# Patient Record
Sex: MALE | Race: WHITE | Employment: OTHER | URBAN - METROPOLITAN AREA
[De-identification: names, ages, dates, MRNs, and addresses within clinical notes are randomized per-mention and may not be internally consistent; named-entity substitution may affect disease eponyms.]

---

## 2017-04-03 ENCOUNTER — OFFICE VISIT (OUTPATIENT)
Dept: CARDIOLOGY CLINIC | Age: 72
End: 2017-04-03

## 2017-04-03 ENCOUNTER — NURSE ONLY (OUTPATIENT)
Dept: CARDIOLOGY CLINIC | Age: 72
End: 2017-04-03

## 2017-04-03 VITALS
SYSTOLIC BLOOD PRESSURE: 150 MMHG | DIASTOLIC BLOOD PRESSURE: 90 MMHG | HEART RATE: 66 BPM | WEIGHT: 219 LBS | HEIGHT: 73 IN | BODY MASS INDEX: 29.03 KG/M2

## 2017-04-03 DIAGNOSIS — R00.2 PALPITATIONS: Primary | ICD-10-CM

## 2017-04-03 DIAGNOSIS — E78.5 HYPERLIPIDEMIA, UNSPECIFIED HYPERLIPIDEMIA TYPE: ICD-10-CM

## 2017-04-03 PROCEDURE — 99243 OFF/OP CNSLTJ NEW/EST LOW 30: CPT | Performed by: INTERNAL MEDICINE

## 2017-04-03 PROCEDURE — 93000 ELECTROCARDIOGRAM COMPLETE: CPT | Performed by: INTERNAL MEDICINE

## 2017-04-03 RX ORDER — SIMVASTATIN 10 MG
10 TABLET ORAL NIGHTLY
COMMUNITY

## 2017-05-04 ENCOUNTER — TELEPHONE (OUTPATIENT)
Dept: CARDIOLOGY CLINIC | Age: 72
End: 2017-05-04

## 2017-05-05 PROCEDURE — 93228 REMOTE 30 DAY ECG REV/REPORT: CPT | Performed by: INTERNAL MEDICINE

## 2017-05-15 ENCOUNTER — OFFICE VISIT (OUTPATIENT)
Dept: CARDIOLOGY CLINIC | Age: 72
End: 2017-05-15

## 2017-05-15 VITALS
WEIGHT: 214 LBS | HEIGHT: 73 IN | SYSTOLIC BLOOD PRESSURE: 120 MMHG | BODY MASS INDEX: 28.36 KG/M2 | HEART RATE: 62 BPM | DIASTOLIC BLOOD PRESSURE: 70 MMHG

## 2017-05-15 DIAGNOSIS — R00.2 PALPITATION: Primary | ICD-10-CM

## 2017-05-15 DIAGNOSIS — E78.5 HYPERLIPIDEMIA, UNSPECIFIED HYPERLIPIDEMIA TYPE: ICD-10-CM

## 2017-05-15 PROCEDURE — 99214 OFFICE O/P EST MOD 30 MIN: CPT | Performed by: INTERNAL MEDICINE

## 2017-05-15 PROCEDURE — 93000 ELECTROCARDIOGRAM COMPLETE: CPT | Performed by: INTERNAL MEDICINE

## 2017-05-15 RX ORDER — AZITHROMYCIN 250 MG/1
250 TABLET, FILM COATED ORAL DAILY
COMMUNITY
End: 2017-07-13

## 2017-07-13 ENCOUNTER — TELEPHONE (OUTPATIENT)
Dept: CARDIOLOGY CLINIC | Age: 72
End: 2017-07-13

## 2017-07-13 DIAGNOSIS — R00.2 PALPITATIONS: Primary | ICD-10-CM

## 2017-07-17 ENCOUNTER — NURSE ONLY (OUTPATIENT)
Dept: CARDIOLOGY CLINIC | Age: 72
End: 2017-07-17

## 2017-07-17 DIAGNOSIS — R00.2 PALPITATIONS: ICD-10-CM

## 2017-07-24 ENCOUNTER — TELEPHONE (OUTPATIENT)
Dept: CARDIOLOGY CLINIC | Age: 72
End: 2017-07-24

## 2017-08-30 PROCEDURE — 93228 REMOTE 30 DAY ECG REV/REPORT: CPT | Performed by: INTERNAL MEDICINE

## 2017-09-05 ENCOUNTER — TELEPHONE (OUTPATIENT)
Dept: CARDIOLOGY CLINIC | Age: 72
End: 2017-09-05

## 2017-09-07 ENCOUNTER — TELEPHONE (OUTPATIENT)
Dept: CARDIOLOGY CLINIC | Age: 72
End: 2017-09-07

## 2017-10-06 ENCOUNTER — OFFICE VISIT (OUTPATIENT)
Dept: CARDIOLOGY CLINIC | Age: 72
End: 2017-10-06

## 2017-10-06 VITALS
BODY MASS INDEX: 28.23 KG/M2 | WEIGHT: 213 LBS | SYSTOLIC BLOOD PRESSURE: 130 MMHG | OXYGEN SATURATION: 98 % | DIASTOLIC BLOOD PRESSURE: 80 MMHG | HEART RATE: 72 BPM | HEIGHT: 73 IN

## 2017-10-06 DIAGNOSIS — R00.2 PALPITATIONS: Primary | ICD-10-CM

## 2017-10-06 DIAGNOSIS — E78.5 HYPERLIPIDEMIA, UNSPECIFIED HYPERLIPIDEMIA TYPE: ICD-10-CM

## 2017-10-06 PROCEDURE — 99214 OFFICE O/P EST MOD 30 MIN: CPT | Performed by: INTERNAL MEDICINE

## 2017-10-06 PROCEDURE — 93000 ELECTROCARDIOGRAM COMPLETE: CPT | Performed by: INTERNAL MEDICINE

## 2017-10-06 NOTE — LETTER
[address] [address] [address] [address]  Phone: [phone]  Fax: [phone]    Abran Swanson MD        October 9, 2017     Kareem Mendoza MD  [facility] [id number]    Patient: Mark Tate  MR Number: [id number]  YOB: 1945  Date of Visit: 10/6/2017    Dear Dr. Kareem Mendoza:    Below are the relevant portions of my assessment and plan of care. [address]   Cardiac Follow up    Referring Provider:  Kareem Mendoza MD     Chief Complaint   Patient presents with    Palpitations     review mcot       HPI:  Mark Tate is a 71 y.o. male who is here for follow up on his history of palpitations. Most of his palpitations tend to occur at night and wake him up from his sleep,usually between 1-3 am, described as heart racing with regular rhythm. He did have one episode that occurred during the day. Episode lasted up to 30 minutes although majority of episodes lasted a few minutes. He has had 10-12 episodes since onset of symptoms. He has not identified triggers for palpitations. He denies associated symptoms of shortness of breath, chest discomfort, dizziness. He drinks coffee in the am but he doesn't consume caffeinated products later in the day. He underwent Nuclear stress test in 2014 due to complaints in palpitations which showed normal perfusion and EF 63%. He wore a holter monitor in 5/2016 which showed SR with avg HR 62 (), 37 PACs, 3543 PVCs. MCOT monitor worn 4/4/17- 5/3/17> showed SR with PVCs, all of which were automatic triggers. He did not have his concerning sx. MCOT monitor worn 7/31/- 8/2/17 - PSVT (? AVNRT ?), NSVT    Today he states he he pushed the monitor once when having symptoms, but he did not think the monitor transmitted a rhythm strip. He also had several other brief palpitations.   The pattern of his palpitations have not

## 2017-10-06 NOTE — PATIENT INSTRUCTIONS
groin, arm, or neck. Then your doctor feeds them into the heart. Wires in the catheters help the doctor find the problem areas. Then he or she uses the wires to send energy to destroy the tiny areas of heart tissue that are causing the problems. It may seem like a bad idea to destroy parts of your heart on purpose. But the areas that are destroyed are very tiny. They should not affect your heart's ability to do its job. You may be awake during the procedure. Or you may be asleep. The doctor will give you medicines to help you feel relaxed and to numb the areas where the catheters go in. You may feel a little uncomfortable, but you should not feel pain. This procedure usually takes 2 to 6 hours. In rare cases, it can take longer. You may stay overnight in the hospital. How long you stay in the hospital depends on the type of ablation you have. What can you expect after catheter ablation? Do not exercise hard or lift anything heavy for a week. You will probably be able to go back to work and to your normal routine in 1 or 2 days. You may have swelling, bruising, or a small lump around the site where the catheters went into your body. These should go away in 3 to 4 weeks. You may have to take some medicines for a while. Follow-up care is a key part of your treatment and safety. Be sure to make and go to all appointments, and call your doctor if you are having problems. It's also a good idea to know your test results and keep a list of the medicines you take. Where can you learn more? Go to https://Medikal.compeIndeed.[employer]. org and sign in to your Winkcam account. Enter A314 in the Billetto box to learn more about \"Learning About Catheter Ablation for Heart Rhythm Problems. \"     If you do not have an account, please click on the \"Sign Up Now\" link. Current as of: July 28, 2016  Content Version: 11.3  © 3681-8652 sciencebite, Incorporated.  Care instructions adapted under license by [facility]

## 2017-10-06 NOTE — MR AVS SNAPSHOT
After Visit Summary             Donney Aschoff   10/6/2017 1:15 PM   Office Visit    Description:  Male : 1945   Provider:  Lisette Diaz MD   Department:  [address] [facility] and Future Appointments         Below is a list of your follow-up and future appointments. This may not be a complete list as you may have made appointments directly with providers that we are not aware of or your providers may have made some for you. Please call your providers to confirm appointments. It is important to keep your appointments. Please bring your current insurance card, photo ID, co-pay, and all medication bottles to your appointment. If self-pay, payment is expected at the time of service. Your To-Do List     Follow-Up    Return in about 6 months (around 2018). Information from Your Visit        Department     Name Address Phone Fax    [address] [address],[address]  [facility] [address] [facility] [phone]      You Were Seen for:         Comments    Palpitations   [785. 1. ICD-9-CM]         Vital Signs     Blood Pressure Pulse Height Weight Oxygen Saturation Body Mass Index    130/80 72 6' 1\" (1.854 m) 213 lb (96.6 kg) 98% 28.1 kg/m2    Smoking Status                   Former Smoker           Additional Information about your Body Mass Index (BMI)           Your BMI as listed above is considered overweight (25.0-29.9). BMI is an estimate of body fat, calculated from your height and weight. The higher your BMI, the greater your risk of heart disease, high blood pressure, type 2 diabetes, stroke, gallstones, arthritis, sleep apnea, and certain cancers. BMI is not perfect. It may overestimate body fat in athletes and people who are more muscular. If your body fat is high you can improve your BMI by decreasing your calorie intake and becoming more physically active. Learn more at: ToyTalkco.uk          Instructions    Vagal maneuvers to stop heart racing/palpitations  1. Bear down like you are having a bowel movement  2. Lie down and breathe in and out slowly and deeply (deep cleansing breaths)  3. Take a cold shower, cold water to face. Consider ablation if episodes become more frequent and bothersome         Supraventricular Tachycardia: Care Instructions  Your Care Instructions    Having supraventricular tachycardia (SVT) means that from time to time your heart beats abnormally fast. This fast rhythm is caused by changes in the electrical system of your heart. You may feel a fluttering in your chest (palpitations) and have a fast pulse. When your heart is beating fast, you may feel anxious and lightheaded, be short of breath, and feel discomfort in the chest.  Your doctor may prescribe medicines to help slow down your heartbeat. Your doctor may also suggest you try vagal maneuvers when having an episode of SVT. These are things, like bearing down, that might help slow your heart rate. Bearing down means that you try to breathe out with your stomach muscles but you don't let air out of your nose or mouth. Your doctor can show you how to do vagal maneuvers. He or she may suggest you lie down on your back to do them. In some cases, either cardioversion treatment or a procedure called catheter ablation is done to correct SVT. Your doctor may ask you to wear a small electronic device for 1 or 2 days to monitor your heart. It is called a Holter monitor. Follow-up care is a key part of your treatment and safety. Be sure to make and go to all appointments, and call your doctor if you are having problems. It's also a good idea to know your test results and keep a list of the medicines you take. How can you care for yourself at home? · Take your medicines exactly as prescribed.  Call your doctor if you think you are having a problem with your medicine. You will get more details on the specific medicines your doctor prescribes. · If your doctor showed you how to do vagal maneuvers, try them when you have an episode. These maneuvers include bearing down or putting an ice-cold, wet towel on your face. · Monitor your condition by keeping a diary of your SVT episodes. Bring this to your doctor appointments. ¨ Write down how fast or slow your heart was beating. To count your heart rate:  1. Gently place 2 fingers of your hand on the inside of your other wrist, below your thumb. 2. Count the beats for 30 seconds. 3. Then, double the result to get the number of beats per minute. ¨ Write down if your heart rhythm was regular or irregular. ¨ Write down the symptoms you had. ¨ Write down the time of day your symptoms occurred. ¨ Write down how long your symptoms lasted. ¨ Write down what you were doing when your symptoms started. ¨ Write down what may have helped your symptoms go away. · If they trigger episodes, limit or avoid alcohol or drinks with caffeine. · Do not use over-the-counter decongestants, herbal remedies, diet pills, or \"pep\" pills, which often contain stimulants. · Do not use illegal drugs, such as cocaine, ecstasy, or methamphetamine, which can speed up your heart's rhythm. · Do not smoke. Smoking can make this condition worse. If you need help quitting, talk to your doctor about stop-smoking programs and medicines. These can increase your chances of quitting for good. · Be alert for new or worsening symptoms, such as shortness of breath, pounding of your heart, or unusual tiredness. If new symptoms develop or your symptoms become worse, call your doctor. When should you call for help? Call 911 anytime you think you may need emergency care. For example, call if:  · You passed out (lost consciousness). · You have symptoms of a heart attack.  These may include: Catheter ablation is a way to get into your heart and fix the problem. Ablation is not surgery. How is catheter ablation done? Your doctor inserts thin tubes called catheters into a blood vessel in your groin, arm, or neck. Then your doctor feeds them into the heart. Wires in the catheters help the doctor find the problem areas. Then he or she uses the wires to send energy to destroy the tiny areas of heart tissue that are causing the problems. It may seem like a bad idea to destroy parts of your heart on purpose. But the areas that are destroyed are very tiny. They should not affect your heart's ability to do its job. You may be awake during the procedure. Or you may be asleep. The doctor will give you medicines to help you feel relaxed and to numb the areas where the catheters go in. You may feel a little uncomfortable, but you should not feel pain. This procedure usually takes 2 to 6 hours. In rare cases, it can take longer. You may stay overnight in the hospital. How long you stay in the hospital depends on the type of ablation you have. What can you expect after catheter ablation? Do not exercise hard or lift anything heavy for a week. You will probably be able to go back to work and to your normal routine in 1 or 2 days. You may have swelling, bruising, or a small lump around the site where the catheters went into your body. These should go away in 3 to 4 weeks. You may have to take some medicines for a while. Follow-up care is a key part of your treatment and safety. Be sure to make and go to all appointments, and call your doctor if you are having problems. It's also a good idea to know your test results and keep a list of the medicines you take. Where can you learn more? Go to https://rickey.AnyWare Group. org and sign in to your Ngt4u.inc account. Enter I028 in the [facility] box to learn more about \"Learning About Catheter Ablation for Heart Rhythm Problems. \" If you do not have an account, please click on the \"Sign Up Now\" link. Current as of: July 28, 2016  Content Version: 11.3  © 4652-0166 HelpAround, Incorporated. Care instructions adapted under license by [facility] ([facility]). If you have questions about a medical condition or this instruction, always ask your healthcare professional. [address] any warranty or liability for your use of this information. Medications and Orders      Your Current Medications Are              simvastatin (ZOCOR) 10 MG tablet Take 10 mg by mouth nightly    aspirin 81 MG tablet Take 81 mg by mouth daily. Allergies           No Known Allergies      We Ordered/Performed the following           EKG 12 Lead          Result Summary for EKG 12 Lead      Result Information     Status          Final result (Resulted: 10/6/2017)           10/6/2017  1:21 PM      Scans on Order [id number]            ECG on 10/6/2017  1:21 PM : ECG Report                     Additional Information        Basic Information     Date Of Birth Sex Race Ethnicity Preferred Language    1945 Male White Non-/Non  English      Problem List as of 10/6/2017  Date Reviewed: 10/6/2017                Palpitation    Hyperlipidemia    Palpitations    Patellofemoral arthritis      Preventive Care        Date Due    One-time abdominal aortic aneurism (AAA) screening is recommended for all men between the age of 73-68 who have ever smoked 1945    Hepatitis C screening is recommended for all adults regardless of risk factors born between [facility] at least once (lifetime) who have never been tested.  1945    Tetanus Combination Vaccine (1 - Tdap) 9/17/1965    Cholesterol Screening 9/17/1986    Colonoscopy 9/17/1996    Zoster Vaccine 9/17/2006    Pneumococcal Vaccines (two) for all adults aged 72 and over (2 of 2 - PPSV23) 6/15/2017    Yearly Flu Vaccine (1) 9/1/2017            MyChart Signup

## 2017-10-06 NOTE — PROGRESS NOTES
[address]   Cardiac Follow up    Referring Provider:  Daisy Zelaya MD     Chief Complaint   Patient presents with    Palpitations     review mcot       HPI:  Otf Núñez is a 71 y.o. male who is here for follow up on his history of palpitations. Most of his palpitations tend to occur at night and wake him up from his sleep,usually between 1-3 am, described as heart racing with regular rhythm. He did have one episode that occurred during the day. Episode lasted up to 30 minutes although majority of episodes lasted a few minutes. He has had 10-12 episodes since onset of symptoms. He has not identified triggers for palpitations. He denies associated symptoms of shortness of breath, chest discomfort, dizziness. He drinks coffee in the am but he doesn't consume caffeinated products later in the day. He underwent Nuclear stress test in 2014 due to complaints in palpitations which showed normal perfusion and EF 63%. He wore a holter monitor in 5/2016 which showed SR with avg HR 62 (), 37 PACs, 3543 PVCs. MCOT monitor worn 4/4/17- 5/3/17> showed SR with PVCs, all of which were automatic triggers. He did not have his concerning sx. MCOT monitor worn 7/31/- 8/2/17 - PSVT (? AVNRT ?), NSVT    Today he states he he pushed the monitor once when having symptoms, but he did not think the monitor transmitted a rhythm strip. He also had several other brief palpitations. The pattern of his palpitations have not changed since his initial visit. He has found that bearing down has relieved his heart racing. He denies exertional shortness of breath or chest pain. He is active by playing tennis and does physical work outside in his yard. He denies limitations in his activity/exercise tolerance. Past Medical History:   has a past medical history of Hyperlipidemia. Surgical History:   has a past surgical history that includes hernia repair and Knee arthroscopy (Left). is alert without any gross abnormalities. Skin: Skin is warm and dry. Psychiatric: He has a normal mood and affect. Pharmacologic nuclear stress test in 10/2014 - normal perfusion, EF 63%    Holter monitor 5/2016; SR, avg HR 62. PACs (37) and PVCs ( 3543) noted. MCOT April 2017: SR with PVCs. 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 EKG today 10/6/17 --  SR, PVCs, 66 bpm    Assessment:  1. Palpitation - intermittent palpitations, tends to occur more at night. Rests and tries to be calm to help resolve symptom. MCOT July/August - brief PSVT, NSVT. Sx likely avnrt   Palpitations have improved with vagal maneuvers thus far   2. Hyperlipidemia, unspecified hyperlipidemia type - treated with simvastatin. LDL 89 in 12/2016. Plan:   Mr. Chanell Bryan is stable from the cardiac standpoint. 1.  Discussed vagal maneuvers  2. Consider RFCA or meds if palpitations become more bothersome  3. Follow up in six months. Criss Morrison M.D.

## 2017-10-09 NOTE — COMMUNICATION BODY
Social History:   reports that he quit smoking about 31 years ago. He does not have any smokeless tobacco history on file. He reports that he drinks alcohol. Family History:  family history includes High Blood Pressure in his mother. There is no history of Stroke. Home Medications:  Outpatient Encounter Prescriptions as of 10/6/2017   Medication Sig Dispense Refill    simvastatin (ZOCOR) 10 MG tablet Take 10 mg by mouth nightly      aspirin 81 MG tablet Take 81 mg by mouth daily. No facility-administered encounter medications on file as of 10/6/2017. Allergies:  Review of patient's allergies indicates no known allergies. Review of Systems   Constitutional: Negative for activity change and appetite change. HENT: Negative for facial swelling and neck pain. Eyes: Negative for discharge and itching. Respiratory: Negative for chest tightness and shortness of breath. Cardiovascular: Palpitations intermittently. Negative for chest pain. Negative for leg swelling. Gastrointestinal: Negative for abdominal pain and abdominal distention. Genitourinary: Negative. Musculoskeletal: Negative. Skin: Negative for color change and pallor. Neurological: Negative for dizziness, syncope and light-headedness. Hematological: Negative. Psychiatric/Behavioral: Negative for behavioral problems and agitation. /80  Pulse 72  Ht 6' 1\" (1.854 m)  Wt 213 lb (96.6 kg)  SpO2 98%  BMI 28.1 kg/m2        Objective:  Physical Exam   Nursing note and vitals reviewed. Constitutional: He appears well-developed and well-nourished. Head: atraumatic. Eyes: Right eye exhibits no discharge. Left eye exhibits no discharge. Neck: Neck supple. Cardiovascular: Normal rate, regular rhythm and normal heart sounds without murmur, gallop, or rub. Pulmonary/Chest: Effort normal and breath sounds normal.   Abdominal: Soft. Musculoskeletal: He exhibits no edema.    Neurological: He is alert without any gross abnormalities. Skin: Skin is warm and dry. Psychiatric: He has a normal mood and affect. Pharmacologic nuclear stress test in 10/2014 - normal perfusion, EF 63%    Holter monitor 5/2016; SR, avg HR 62. PACs (37) and PVCs ( 3543) noted. MCOT April 2017: SR with PVCs. 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 EKG today 10/6/17 --  SR, PVCs, 66 bpm    Assessment:  1. Palpitation - intermittent palpitations, tends to occur more at night. Rests and tries to be calm to help resolve symptom. MCOT July/August - brief PSVT, NSVT. Sx likely avnrt   Palpitations have improved with vagal maneuvers thus far   2. Hyperlipidemia, unspecified hyperlipidemia type - treated with simvastatin. LDL 89 in 12/2016. Plan:   Mr. Deng Prasad is stable from the cardiac standpoint. 1.  Discussed vagal maneuvers  2. Consider RFCA or meds if palpitations become more bothersome  3. Follow up in six months. Rosie Morrison M.D.

## 2018-03-05 ENCOUNTER — HOSPITAL ENCOUNTER (OUTPATIENT)
Dept: MRI IMAGING | Age: 73
Discharge: HOME | End: 2018-03-05
Attending: ORTHOPAEDIC SURGERY | Admitting: ORTHOPAEDIC SURGERY

## 2018-03-05 DIAGNOSIS — M23.307 DEGENERATIVE TEAR OF MENISCUS, LEFT: ICD-10-CM

## 2018-03-05 DIAGNOSIS — M23.307 OTHER MENISCUS DERANGEMENTS, UNSPECIFIED MENISCUS, LEFT KNEE: ICD-10-CM

## 2018-03-13 ENCOUNTER — TELEPHONE (OUTPATIENT)
Dept: CARDIOLOGY CLINIC | Age: 73
End: 2018-03-13

## 2018-03-14 ENCOUNTER — TELEPHONE (OUTPATIENT)
Dept: ORTHOPEDIC SURGERY | Age: 73
End: 2018-03-14

## 2018-03-22 ENCOUNTER — HOSPITAL ENCOUNTER (OUTPATIENT)
Dept: SURGERY | Age: 73
Discharge: HOME | End: 2018-03-22
Attending: ORTHOPAEDIC SURGERY | Admitting: ORTHOPAEDIC SURGERY

## 2018-03-22 VITALS
HEIGHT: 73 IN | RESPIRATION RATE: 14 BRPM | SYSTOLIC BLOOD PRESSURE: 122 MMHG | BODY MASS INDEX: 28.7 KG/M2 | DIASTOLIC BLOOD PRESSURE: 81 MMHG | WEIGHT: 216.56 LBS | TEMPERATURE: 97 F | OXYGEN SATURATION: 97 % | HEART RATE: 48 BPM

## 2018-03-22 LAB
ANION GAP SERPL CALCULATED.3IONS-SCNC: 12 MMOL/L (ref 3–16)
BUN BLDV-MCNC: 15 MG/DL (ref 7–20)
CALCIUM SERPL-MCNC: 9.4 MG/DL (ref 8.3–10.6)
CHLORIDE BLD-SCNC: 102 MMOL/L (ref 99–110)
CO2: 26 MMOL/L (ref 21–32)
CREAT SERPL-MCNC: 0.8 MG/DL (ref 0.8–1.3)
GFR AFRICAN AMERICAN: >60
GFR NON-AFRICAN AMERICAN: >60
GLUCOSE BLD-MCNC: 91 MG/DL (ref 70–99)
HCT VFR BLD CALC: 45.7 % (ref 40.5–52.5)
HEMOGLOBIN: 15.4 G/DL (ref 13.5–17.5)
MCH RBC QN AUTO: 29.5 PG (ref 26–34)
MCHC RBC AUTO-ENTMCNC: 33.6 G/DL (ref 31–36)
MCV RBC AUTO: 87.8 FL (ref 80–100)
PDW BLD-RTO: 15.3 % (ref 12.4–15.4)
PLATELET # BLD: 194 K/UL (ref 135–450)
PMV BLD AUTO: 8.1 FL (ref 5–10.5)
POTASSIUM SERPL-SCNC: 4 MMOL/L (ref 3.5–5.1)
RBC # BLD: 5.21 M/UL (ref 4.2–5.9)
SODIUM BLD-SCNC: 140 MMOL/L (ref 136–145)
WBC # BLD: 6.8 K/UL (ref 4–11)

## 2018-03-22 RX ORDER — ONDANSETRON 2 MG/ML
4 INJECTION INTRAMUSCULAR; INTRAVENOUS PRN
Status: DISCONTINUED | OUTPATIENT
Start: 2018-03-22 | End: 2018-03-23 | Stop reason: HOSPADM

## 2018-03-22 RX ORDER — OXYCODONE HYDROCHLORIDE 5 MG/1
10 TABLET ORAL PRN
Status: ACTIVE | OUTPATIENT
Start: 2018-03-22 | End: 2018-03-22

## 2018-03-22 RX ORDER — HYDROMORPHONE HCL 110MG/55ML
0.25 PATIENT CONTROLLED ANALGESIA SYRINGE INTRAVENOUS EVERY 5 MIN PRN
Status: DISCONTINUED | OUTPATIENT
Start: 2018-03-22 | End: 2018-03-23 | Stop reason: HOSPADM

## 2018-03-22 RX ORDER — FENTANYL CITRATE 50 UG/ML
50 INJECTION, SOLUTION INTRAMUSCULAR; INTRAVENOUS EVERY 5 MIN PRN
Status: DISCONTINUED | OUTPATIENT
Start: 2018-03-22 | End: 2018-03-23 | Stop reason: HOSPADM

## 2018-03-22 RX ORDER — CEFAZOLIN SODIUM 2 G/100ML
2 INJECTION, SOLUTION INTRAVENOUS
Status: COMPLETED | OUTPATIENT
Start: 2018-03-22 | End: 2018-03-22

## 2018-03-22 RX ORDER — MEPERIDINE HYDROCHLORIDE 25 MG/ML
12.5 INJECTION INTRAMUSCULAR; INTRAVENOUS; SUBCUTANEOUS EVERY 5 MIN PRN
Status: DISCONTINUED | OUTPATIENT
Start: 2018-03-22 | End: 2018-03-23 | Stop reason: HOSPADM

## 2018-03-22 RX ORDER — HYDROMORPHONE HCL 110MG/55ML
0.5 PATIENT CONTROLLED ANALGESIA SYRINGE INTRAVENOUS EVERY 5 MIN PRN
Status: DISCONTINUED | OUTPATIENT
Start: 2018-03-22 | End: 2018-03-23 | Stop reason: HOSPADM

## 2018-03-22 RX ORDER — ONDANSETRON 2 MG/ML
4 INJECTION INTRAMUSCULAR; INTRAVENOUS
Status: ACTIVE | OUTPATIENT
Start: 2018-03-22 | End: 2018-03-22

## 2018-03-22 RX ORDER — LIDOCAINE HYDROCHLORIDE 10 MG/ML
0.5 INJECTION, SOLUTION EPIDURAL; INFILTRATION; INTRACAUDAL; PERINEURAL ONCE
Status: DISCONTINUED | OUTPATIENT
Start: 2018-03-22 | End: 2018-03-23 | Stop reason: HOSPADM

## 2018-03-22 RX ORDER — SODIUM CHLORIDE 0.9 % (FLUSH) 0.9 %
10 SYRINGE (ML) INJECTION PRN
Status: DISCONTINUED | OUTPATIENT
Start: 2018-03-22 | End: 2018-03-23 | Stop reason: HOSPADM

## 2018-03-22 RX ORDER — FENTANYL CITRATE 50 UG/ML
25 INJECTION, SOLUTION INTRAMUSCULAR; INTRAVENOUS EVERY 5 MIN PRN
Status: DISCONTINUED | OUTPATIENT
Start: 2018-03-22 | End: 2018-03-23 | Stop reason: HOSPADM

## 2018-03-22 RX ORDER — OXYCODONE HYDROCHLORIDE 5 MG/1
5 TABLET ORAL PRN
Status: ACTIVE | OUTPATIENT
Start: 2018-03-22 | End: 2018-03-22

## 2018-03-22 RX ORDER — SODIUM CHLORIDE 9 MG/ML
INJECTION, SOLUTION INTRAVENOUS CONTINUOUS
Status: DISCONTINUED | OUTPATIENT
Start: 2018-03-22 | End: 2018-03-23 | Stop reason: HOSPADM

## 2018-03-22 RX ORDER — SODIUM CHLORIDE 0.9 % (FLUSH) 0.9 %
10 SYRINGE (ML) INJECTION EVERY 12 HOURS SCHEDULED
Status: DISCONTINUED | OUTPATIENT
Start: 2018-03-22 | End: 2018-03-23 | Stop reason: HOSPADM

## 2018-03-22 RX ORDER — LIDOCAINE HYDROCHLORIDE 10 MG/ML
1 INJECTION, SOLUTION EPIDURAL; INFILTRATION; INTRACAUDAL; PERINEURAL
Status: ACTIVE | OUTPATIENT
Start: 2018-03-22 | End: 2018-03-22

## 2018-03-22 RX ADMIN — SODIUM CHLORIDE: 9 INJECTION, SOLUTION INTRAVENOUS at 10:35

## 2018-03-22 RX ADMIN — CEFAZOLIN SODIUM 2 G: 2 INJECTION, SOLUTION INTRAVENOUS at 11:30

## 2018-03-22 ASSESSMENT — PAIN - FUNCTIONAL ASSESSMENT: PAIN_FUNCTIONAL_ASSESSMENT: 0-10

## 2018-03-22 NOTE — BRIEF OP NOTE
Brief Postoperative Note    Kamryn Agarwal  YOB: 1945  [id number]    Pre-operative Diagnosis: L knee meniscus tear    Post-operative Diagnosis: Same    Procedure: L knee arthroscopy, menisectomy    Anesthesia: General    Surgeons/Assistants:   JACKIE Williamson    Estimated Blood Loss: less than 50     Complications: None    Specimens: Was Not Obtained    Findings: See Dictation    Electronically signed by Malinda Bee MD on 3/22/2018 at 10:28 AM

## 2018-03-22 NOTE — ANESTHESIA PRE-OP
03/21/18 138/88   03/13/18 139/86   02/27/18 123/81       CBC  No results found for: WBC, RBC, HGB, HCT, MCV, RDW, PLT    CMP  No results found for: NA, K, CL, CO2, BUN, CREATININE, GFRAA, AGRATIO, LABGLOM, GLUCOSE, PROT, CALCIUM, BILITOT, ALKPHOS, AST, ALT    BMP  No results found for: NA, K, CL, CO2, BUN, CREATININE, CALCIUM, GFRAA, LABGLOM, GLUCOSE    Coags   No results found for: PROTIME, INR, APTT    HCG (If Applicable) No results found for: PREGTESTUR, PREGSERUM, HCG, HCGQUANT     ABGs  No results found for: PHART, PO2ART, [id number], ZHX1BFO, BEART, [id number]     Type & Screen (If Applicable)  No results found for: LABABO, LABRH      POCGlucose  No results for input(s): GLUCOSE in the last 72 hours. NPO Status  > 8 hours                       BMI  There is no height or weight on file to calculate BMI. Estimated body mass index is 29.15 kg/m² as calculated from the following:    Height as of 3/21/18: 6' (1.829 m). Weight as of 3/21/18: 214 lb 15.2 oz (97.5 kg). Additional Testing (Echo, Stress, ECG, PFTs, etc)        Anesthesia Evaluation  Patient summary reviewed and Nursing notes reviewed history of anesthetic complications:   Airway: Mallampati: II  TM distance: >3 FB   Neck ROM: full  Mouth opening: > = 3 FB Dental: normal exam         Pulmonary:Negative Pulmonary ROS and normal exam    (+) sleep apnea:                             Cardiovascular:  Exercise tolerance: good (>4 METS),   (+) hyperlipidemia      ECG reviewed  Rhythm: regular  Rate: normal           Beta Blocker:  Not on Beta Blocker         Neuro/Psych:   Negative Neuro/Psych ROS              GI/Hepatic/Renal: Neg GI/Hepatic/Renal ROS            Endo/Other: Negative Endo/Other ROS                    Abdominal:           Vascular: negative vascular ROS. Anesthesia Plan      general     ASA 2     (Plan for MAC with standard ASA monitoring.  Additional monitoring as dictated by intra-operative course. Patient appropriately NPO for the procedure. Risk/Benefits reviewed with patient and all anesthetic questions answered prior to procedure.  )  Induction: intravenous. MIPS: Postoperative opioids intended and Prophylactic antiemetics administered. Anesthetic plan and risks discussed with patient. Plan discussed with CRNA. DOS STAFF ADDENDUM:    Pt seen and examined, chart reviewed (including anesthesia, drug and allergy history). No interval changes to history and physical examination. Anesthetic plan, risks, benefits, alternatives, and personnel involved discussed with patient. Patient verbalized an understanding and agrees to proceed.       Shima Reyna DO  March 22, 2018  9:24 AM

## 2018-03-22 NOTE — ANESTHESIA POST-OP
Anesthesia Post-op Note    Patient: Sukhwinder Vyas  MRN: [id number]  YOB: 1945  Date of evaluation: 3/22/2018  Time:  1:49 PM     Procedure(s) Performed:     Last Vitals: /81   Pulse (!) 48   Temp 97 °F (36.1 °C) (Temporal)   Resp 14   Ht 6' 1\" (1.854 m)   Wt 216 lb 9 oz (98.2 kg)   SpO2 97%   BMI 28.57 kg/m²     Airam Phase I: Airam Score: 3    Airam Phase II:      Anesthesia Post Evaluation    Final anesthesia type: general  Patient location during evaluation: PACU  Patient participation: complete - patient participated  Level of consciousness: awake and alert  Pain score: 0  Airway patency: patent  Nausea & Vomiting: no nausea and no vomiting  Complications: no  Cardiovascular status: blood pressure returned to baseline  Respiratory status: acceptable  Hydration status: euvolemic        Jaiden Posadas DO  1:49 PM

## 2018-03-23 ENCOUNTER — HOSPITAL ENCOUNTER (OUTPATIENT)
Dept: PHYSICAL THERAPY | Age: 73
Discharge: HOME | End: 2018-03-31
Admitting: ORTHOPAEDIC SURGERY

## 2018-03-27 ENCOUNTER — HOSPITAL ENCOUNTER (OUTPATIENT)
Dept: PHYSICAL THERAPY | Age: 73
Discharge: HOME | End: 2018-03-28
Admitting: ORTHOPAEDIC SURGERY

## 2018-03-27 NOTE — FLOWSHEET NOTE
[address] [facility], Adan Abraham    Physical Therapy Daily Treatment Note  Date:  3/27/2018    Patient Name:  Amanda Nance   \"Maycol\" :  1945  MRN: [id number]  Medical/Treatment Diagnosis Information:  Diagnosis: S83.282 (ICD-10-CM) - Tear of lateral meniscus of left knee, current, unspecified tear type; L knee plm, plica excision dos:   Treatment Diagnosis: Knee Effusion L. M25.462; R26.2 difficulty walking   Insurance/Certification information:  PT Insurance Information: Aetna [facility]  Physician Information:  Referring Practitioner: Dr. Carlos Desir of care signed (Y/N):     Date of Patient follow up with Physician:     G-Code (if applicable):      Date G-Code Applied:         Progress Note: [x]  Yes  []  No  Next due by: Visit #10       Latex Allergy:  [x]NO      []YES  Preferred Language for Healthcare:   [x]English       []other:    Visit # Insurance Allowable   2 MN     Pain level:  4/10     SUBJECTIVE:  Felt good yesterday, little sore this am.  He reports no distal symptoms. Taking steps one at a time and very cautious. Compliant with HEP. OBJECTIVE:     Girth (cm) Pre-vaso Post-vaso:     L R L R   Mid-patellar 45 43.5 44.5 -   Suprapatellar 45 43.0 45 -       RESTRICTIONS/PRECAUTIONS:     Exercises/Interventions:     Therapeutic Exercises  Resistance / level Sets/sec Reps Notes   Bike ROM               Ankle pumps  HEP   Seated gastroc stretch - towel pull  30\" 5    Seated HS stretch  HEP   Heel slides - knee flexion PROM  5\" 15    Quad sets  5 sec 10    SLR - flexion  3 10    SLR - abduction  3 10    TKE  10 sec 15 3.5 pl   Leg press isometric  5 sec 8 30 deg   SLS/marching  5 sec 10    Gastroc st  20 sec 3    Neuromuscular Re-ed / Therapeutic Activities                             Manual Intervention       Knee mobs/PROM       Tib/Fem Mobs       Patella Mobs 5 min      Ankle mobs                       Patient Education:  Pt was ed on S&S of promoting relaxation,  increasing ROM, reducing/eliminating soft tissue swelling/inflammation/restriction, improving soft tissue extensibility and allowing for proper ROM for normal function with self care, mobility, lifting and ambulation. Modalities:  [x] (86154) Vasopneumatic compression: Utilized vasopneumatic compression to decrease edema / swelling for the purpose of improving mobility and quad tone / recruitment which will allow for increased overall function including but not limited to self-care, transfers, ambulation, and ascending / descending stairs. Modalities: CP x 10 min        Charges:  Timed Code Treatment Minutes: 45   Total Treatment Minutes: 55     [] EVAL - LOW (30457)   [] EVAL - MOD (68187)  [] EVAL - HIGH (80699)  [] RE-EVAL (88329)  [x] EA(34127) x  3   [] IONTO  [] NMR (18228) x      [x] VASO  [] Manual (27498) x       [] Other:  [] TA x       [] Mech Traction (68853)  [] ES(attended) (97922)      [] ES (un) (00003):     GOALS:  Patient stated goal: Return back to playing recreational tennis. Therapist goals for Patient:   Short Term Goals: To be achieved in: 2 weeks  1. Independent in HEP and progression per patient tolerance, in order to prevent re-injury. 2. Patient will have a decrease in pain to facilitate improvement in movement, function, and ADLs as indicated by Functional Deficits. Long Term Goals: To be achieved in: 8-10 weeks  1. Disability index score of 20% or less for the LEFS to assist with reaching prior level of function. 2. Patient will demonstrate increased AROM L knee 0-130 to allow for proper joint functioning as indicated by patients Functional Deficits. 3. Patient will demonstrate an increase in Strength to at least 4+/5 throughout in LLE as well as good proximal hip strength and control to allow for proper functional mobility as indicated by patients Functional Deficits.    4. Patient will return to functional activities including ambulation without AD and without increased symptoms or restriction. 5. Pt will return to fishing and playing recreational tennis in order to return back to [facility]. Progression Towards Functional goals:  [] Patient is progressing as expected towards functional goals listed. [] Progression is slowed due to complexities listed. [] Progression has been slowed due to co-morbidities. [x] Plan just implemented, too soon to assess goals progression  [] Other:     Persisting Functional Limitations/Impairments:  [x]Sitting [x]Standing   [x]Walking [x]Squatting/bending    [x]Stairs []ADL's    [x]Transfers []Reaching  []Housework [x]Job related tasks  [x]Driving [x]Sports/Recreation   []Other:    ASSESSMENT:  ROM doing well, good quad activation with no lag. Gait is fairly symmetrical with mild increased knee flexion during gait. He has mild decrease in SLS. Mod supra patella swelling today but well tolerated. Treatment/Activity Tolerance:  [x] Patient tolerated treatment well [] Patient limited by fatique  [] Patient limited by pain  [] Patient limited by other medical complications  [] Other:     Prognosis: [x] Good [] Fair  [] Poor    Patient Requires Follow-up: [x] Yes  [] No    Return to Play:    [x]  N/A   []  Stage 1: Intro to Strength   []  Stage 2: Return to Run and Strength   []  Stage 3: Return to Jump and Strength   []  Stage 4: Dynamic Strength and Agility   []  Stage 5: Sport Specific Training     []  Ready to Return to Play, Meets All Above Stages   []  Not Ready for Return to Sports   Comments:            PLAN: See eval; pt.  To continue therapy at [facility]  [] Continue per plan of care [] Alter current plan (see comments)  [x] Plan of care initiated [] Hold pending MD visit [] Discharge    Electronically signed by: Lorenzo Chung PT, DPT

## 2018-03-29 ENCOUNTER — HOSPITAL ENCOUNTER (OUTPATIENT)
Dept: PHYSICAL THERAPY | Age: 73
Discharge: HOME | End: 2018-03-30
Admitting: ORTHOPAEDIC SURGERY

## 2018-03-29 NOTE — FLOWSHEET NOTE
[address] [place] [address] and [facility], [place]    Physical Therapy Daily Treatment Note  Date:  3/29/2018    Patient Name:  Gómez Burch   \"Maycol\" :  1945  MRN: [id number]  Medical/Treatment Diagnosis Information:  Diagnosis: S83.282 (ICD-10-CM) - Tear of lateral meniscus of left knee, current, unspecified tear type; L knee plm, plica excision dos: 43  Treatment Diagnosis: Knee Effusion L. M25.462; R26.2 difficulty walking   Insurance/Certification information:  PT Insurance Information: Aetna  W Jeff Coppola  Physician Information:  Referring Practitioner: Dr. Hernandez Medicus of care signed (Y/N):     Date of Patient follow up with Physician:     G-Code (if applicable):      Date G-Code Applied:         Progress Note: [x]  Yes  []  No  Next due by: Visit #10       Latex Allergy:  [x]NO      []YES  Preferred Language for Healthcare:   [x]English       []other:    Visit # Insurance Allowable   3 MN     Pain level:  4/10     SUBJECTIVE:  Doing well. No soreness after yesterday. Walking with cane is better. OBJECTIVE:     Girth (cm) Pre-vaso Post-vaso:     L R L R   Mid-patellar 45 43.5 44.5 -   Suprapatellar 45 43.0 45 -       RESTRICTIONS/PRECAUTIONS:     Exercises/Interventions:     Therapeutic Exercises  Resistance / level Sets/sec Reps Notes   Bike ROM  5 min   Sportcord  Fwd back        Ankle pumps  HEP   Seated gastroc stretch - towel pull  30\" 5    Seated HS stretch  HEP   Heel slides - knee flexion PROM  5\" 15    Quad sets  5 sec 10    SLR - flexion  3 10 2 lb   SLR - abduction  3 10    TKE  10 sec 15 3.5 pl   Leg press isometric  5 sec 15 30 deg   SLS/marching  5 sec 10    Step ups 4 \" 2 12    Lateral walk maroon 1 5    Gastroc st  20 sec 3    Neuromuscular Re-ed / Therapeutic Activities                             Manual Intervention       Knee mobs/PROM       Tib/Fem Mobs       Patella Mobs 5 min      Ankle mobs                       Patient Education:  Pt was ed on S&S of infection and S&S of DVT and what to do if they happen. Pt was ed on showering, amb, HEP, clinic, use of ice, dx, prognosis, POC. Pt was agreeable to all. Therapeutic Exercise and NMR EXR  [x] (87398) Provided verbal/tactile cueing for activities related to strengthening, flexibility, endurance, ROM for improvements in LE, proximal hip, and core control with self care, mobility, lifting, ambulation.  [] (26027) Provided verbal/tactile cueing for activities related to improving balance, coordination, kinesthetic sense, posture, motor skill, proprioception  to assist with LE, proximal hip, and core control in self care, mobility, lifting, ambulation and eccentric single leg control.      NMR and Therapeutic Activities:    [] (67522 or 64016) Provided verbal/tactile cueing for activities related to improving balance, coordination, kinesthetic sense, posture, motor skill, proprioception and motor activation to allow for proper function of core, proximal hip and LE with self care and ADLs  [] (92241) Gait Re-education- Provided training and instruction to the patient for proper LE, core and proximal hip recruitment and positioning and eccentric body weight control with ambulation re-education including up and down stairs     Home Exercise Program:    [x] (51008) Reviewed/Progressed HEP activities related to strengthening, flexibility, endurance, ROM of core, proximal hip and LE for functional self-care, mobility, lifting and ambulation/stair navigation   [] (42316)Reviewed/Progressed HEP activities related to improving balance, coordination, kinesthetic sense, posture, motor skill, proprioception of core, proximal hip and LE for self care, mobility, lifting, and ambulation/stair navigation      Manual Treatments:  PROM / STM / Oscillations-Mobs:  G-I, II, III, IV (PA's, Inf., Post.)  [] (32573) Provided manual therapy to mobilize LE, proximal hip and/or LS spine soft tissue/joints for the purpose of

## 2018-04-01 ENCOUNTER — HOSPITAL ENCOUNTER (OUTPATIENT)
Dept: OTHER | Age: 73
Discharge: HOME | End: 2018-04-30
Attending: ORTHOPAEDIC SURGERY | Admitting: ORTHOPAEDIC SURGERY

## 2018-04-03 ENCOUNTER — HOSPITAL ENCOUNTER (OUTPATIENT)
Dept: PHYSICAL THERAPY | Age: 73
Discharge: HOME | End: 2018-04-04
Admitting: ORTHOPAEDIC SURGERY

## 2018-04-10 ENCOUNTER — HOSPITAL ENCOUNTER (OUTPATIENT)
Dept: PHYSICAL THERAPY | Age: 73
Discharge: HOME | End: 2018-04-11
Admitting: ORTHOPAEDIC SURGERY

## 2018-04-12 ENCOUNTER — HOSPITAL ENCOUNTER (OUTPATIENT)
Dept: PHYSICAL THERAPY | Age: 73
Discharge: HOME | End: 2018-04-13
Admitting: ORTHOPAEDIC SURGERY

## 2018-04-19 ENCOUNTER — HOSPITAL ENCOUNTER (OUTPATIENT)
Dept: PHYSICAL THERAPY | Age: 73
Discharge: HOME | End: 2018-04-20
Admitting: ORTHOPAEDIC SURGERY

## 2018-05-01 ENCOUNTER — HOSPITAL ENCOUNTER (OUTPATIENT)
Dept: OTHER | Age: 73
Discharge: HOME | End: 2018-05-31
Attending: ORTHOPAEDIC SURGERY | Admitting: ORTHOPAEDIC SURGERY

## 2019-06-18 ENCOUNTER — OFFICE VISIT (OUTPATIENT)
Dept: ORTHOPEDIC SURGERY | Age: 74
End: 2019-06-18
Payer: COMMERCIAL

## 2019-06-18 VITALS
WEIGHT: 220 LBS | DIASTOLIC BLOOD PRESSURE: 88 MMHG | SYSTOLIC BLOOD PRESSURE: 130 MMHG | BODY MASS INDEX: 29.16 KG/M2 | HEART RATE: 74 BPM | HEIGHT: 73 IN

## 2019-06-18 DIAGNOSIS — M17.11 OSTEOARTHRITIS OF RIGHT KNEE, UNSPECIFIED OSTEOARTHRITIS TYPE: ICD-10-CM

## 2019-06-18 DIAGNOSIS — M25.561 RIGHT KNEE PAIN, UNSPECIFIED CHRONICITY: Primary | ICD-10-CM

## 2019-06-18 PROCEDURE — 20610 DRAIN/INJ JOINT/BURSA W/O US: CPT | Performed by: ORTHOPAEDIC SURGERY

## 2019-06-18 PROCEDURE — 99214 OFFICE O/P EST MOD 30 MIN: CPT | Performed by: ORTHOPAEDIC SURGERY

## 2019-06-18 NOTE — PROGRESS NOTES
Date:  2019    Name:  Valeriy Arreola  Address:  [address] [place] [address]    :  1945      Age:   68 y.o.    SSN:  [id number]      Medical Record Number:  [id number]    Reason for Visit:    Chief Complaint    Knee Pain (OP/NP right knee pain about 3 weeks ago-reports he was moving a piece of furnature using his knee -states pain when driving and walking)      DOS:2019     HPI: Valeriy Arreola is a 68 y.o. male here today for motion of his right knee. He previously had a left knee scope partial meniscectomy and he is doing well in regards with his left knee. His right knee has been hurting for a couple weeks he reports that he was moving some heavy furniture and after that he felt a low level of discomfort in his knee and it has progressed to aching pain and occasionally a sharp pain. He denies any mechanical symptoms such as locking, popping, or catching. He did not have any previous injuries to this knee. He has tried some anti-inflammatories but still has ongoing symptoms. No fever or chills. No numbness or tingling. Pain Assessment  Location of Pain: Knee  Location Modifiers: Right  Severity of Pain: 6  Quality of Pain: Sharp, Aching  Duration of Pain: Persistent  Frequency of Pain: Constant  Date Pain First Started: (3 weeks ago moved a piece of furniture with his knee)  Aggravating Factors: Walking(getting up from sitting)  Limiting Behavior: Yes  Relieving Factors: Nsaids(ibuprofen)  Result of Injury: Yes  Work-Related Injury: No  Are there other pain locations you wish to document?: No  ROS: Review of systems reviewed from Patient History Form completed today and available in the patient's chart under the Media tab.        Past Medical History:   Diagnosis Date    Hyperlipidemia     ARAMIS (obstructive sleep apnea)         Past Surgical History:   Procedure Laterality Date    HERNIA REPAIR      KNEE ARTHROSCOPY Left     OTHER SURGICAL HISTORY Left 2018 LEFT KNEE ARTHROSCOPE,PLICA EXCISION;SYNOVECTOMY PARTIAL    SHOULDER SURGERY Right     NERVE RELEASE -        Family History   Problem Relation Age of Onset    High Blood Pressure Mother     Stroke Neg Hx        Social History     Socioeconomic History    Marital status:      Spouse name: None    Number of children: None    Years of education: None    Highest education level: None   Occupational History    None   Social Needs    Financial resource strain: None    Food insecurity:     Worry: None     Inability: None    Transportation needs:     Medical: None     Non-medical: None   Tobacco Use    Smoking status: Former Smoker     Last attempt to quit: 1986     Years since quittin.4    Smokeless tobacco: Never Used   Substance and Sexual Activity    Alcohol use: Yes     Comment: Occasional     Drug use: None    Sexual activity: None   Lifestyle    Physical activity:     Days per week: None     Minutes per session: None    Stress: None   Relationships    Social connections:     Talks on phone: None     Gets together: None     Attends [employer] service: None     Active member of club or organization: None     Attends meetings of clubs or organizations: None     Relationship status: None    Intimate partner violence:     Fear of current or ex partner: None     Emotionally abused: None     Physically abused: None     Forced sexual activity: None   Other Topics Concern    None   Social History Narrative    None       Current Outpatient Medications   Medication Sig Dispense Refill    simvastatin (ZOCOR) 10 MG tablet Take 10 mg by mouth nightly      aspirin 81 MG tablet Take 81 mg by mouth daily. No current facility-administered medications for this visit. No Known Allergies    Vital signs:  /88   Pulse 74   Ht 6' 1\" (1.854 m)   Wt 220 lb (99.8 kg)   BMI 29.03 kg/m²          Neuro: Alert & oriented x 3,  normal,  no focal deficits noted. Normal affect.   Eyes: narrowing on both knees however he does not have significant osteophyte formation. He does have some lateral narrowing of his patellofemoral joint and mild subluxation and increased lateral tilt especially of the right patellofemoral joint. Impression: Mild osteoarthritis bilateral knees      Assessment: Mild osteoarthritis bilateral knees    Plan: Mr. Garry Fleming is a very pleasant 71-year-old gentleman who is seen today for his right knee mainly. Based on his exam, history and images he has a low level of osteoarthritis. He likely aggravated this underlying condition when doing moving activities. His exam is not consistent with any meniscal pathology. I recommendations for him would be to start an oral anti-inflammatory and to have a cortisone injection in his knee. He tolerated this well. We did see him back on an as-needed basis. We also recommend that he go to physical therapy or do some home exercises to help strengthen his quadricep muscle and improve flexibility. Emanuel Roberts is in agreement with this plan. All questions were answered to patient's satisfaction and was encouraged to call with any further questions. Risks and benefits were explained. Informed consent was obtained. Patient's right knee was sterilely prepped. Following this 2 mL of 40mg/ml Depo-Medrol and 3 mL of ropivacaine was injected into the right knee. Patient tolerated the procedure well with no immediate adverse sequels after the injection. Orders Placed This Encounter   Procedures    XR KNEE RIGHT (3 VIEWS)     Standing Status:   Future     Number of Occurrences:   1     Standing Expiration Date:   6/18/2020     Order Specific Question:   Reason for exam:     Answer:   Pain Karyle Huge, D.O.    Clinical Fellow, [address] [place] [address]  Date:    6/18/2019      The encounter with Yas Lazo was supervised by Dr. Cora Briseno, who personally examined the patient and reviewed the plan. This dictation was performed with a verbal recognition program (DRAGON) and it was checked for errors. It is possible that there are still dictated errors within this office note. If so, please bring any errors to my attention for an addendum. All efforts were made to ensure that this office note is accurate. Attestation:  I was physically present and performed my own examination of this patient and have discussed the case, including pertinent history and exam findings with the fellow. I agree with the documented assessment and plan. Troy Sorenson.  Severiano Abe, MD

## 2019-06-21 ENCOUNTER — HOSPITAL ENCOUNTER (OUTPATIENT)
Dept: PHYSICAL THERAPY | Age: 74
Setting detail: THERAPIES SERIES
Discharge: HOME | End: 2019-06-21
Payer: MEDICARE

## 2019-06-21 PROCEDURE — 97161 PT EVAL LOW COMPLEX 20 MIN: CPT

## 2019-06-21 PROCEDURE — 97140 MANUAL THERAPY 1/> REGIONS: CPT

## 2019-06-21 PROCEDURE — 97110 THERAPEUTIC EXERCISES: CPT

## 2019-06-21 NOTE — PLAN OF CARE
Relevant Medical History:Left knee scope in March 2018  Functional Disability Index: LEFS - 31% impairment    Pain Scale: 4-5/10  Easing factors: rest, cortisone injection  Provocative factors: driving, squatting, prolonged ambulation, ascending stairs    Type: []Constant   [x]Intermittent  []Radiating [x]Localized []other:     Numbness/Tingling: None    Occupation/School: Retired, owns/manages a few rental properties    Living Status/Prior Level of Function: Independent with ADLs and IADLs    OBJECTIVE:     ROM LEFT RIGHT   HIP Flex WNL WNL   HIP Abd WNL WNL   HIP Ext WNL WNL   HIP IR Tight Tight   HIP ER WNL Tight   Knee ext WNL WNL   Knee Flex WNL WNL   Strength  LEFT RIGHT   HIP Flexors     HIP Abductors 5/5 4/5   HIP Ext     Hip ER     Knee EXT (quad) 5/5 4+/5   Knee Flex (HS) 5/5 4+/5     Reflexes/Sensation:    [x]Dermatomes/Myotomes intact    [x]Reflexes equal and normal bilaterally   []Other:    Joint mobility:    [x]Normal    []Hypo   []Hyper    Palpation: tender to palpation of right VL, IT band, lateral HS    Functional Mobility/Transfers: increased right knee valgus with steps/lunges    Posture: WNL    Bandages/Dressings/Incisions: NA    Gait: (include devices/WB status) WNL    Orthopedic Special Tests: NA                       [x] Patient history, allergies, meds reviewed. Medical chart reviewed. See intake form. Review Of Systems (ROS):  [x]Performed Review of systems (Integumentary, CardioPulmonary, Neurological) by intake and observation. Intake form has been scanned into medical record. Patient has been instructed to contact their primary care physician regarding ROS issues if not already being addressed at this time.       Co-morbidities/Complexities (which will affect course of rehabilitation):   [x]None           Arthritic conditions   []Rheumatoid arthritis (M05.9)  []Osteoarthritis (M19.91)   Cardiovascular conditions   []Hypertension (I10)  []Hyperlipidemia (E78.5)  []Angina pectoris with changes of positions or transfers between positions   [x]Reduced ability to maintain good posture and demonstrate good body mechanics with sitting, bending, and lifting   []Reduced ability to sleep   [x] Reduced ability or tolerance with driving and/or computer work   []Reduced ability to perform lifting, carrying tasks   [x]Reduced ability to squat   []Reduced ability to forward bend   [x]Reduced ability to ambulate prolonged functional periods/distances/surfaces   [x]Reduced ability to ascend/descend stairs   []Reduced ability to run, hop or jump   []other:     Participation Restrictions   []Reduced participation in self care activities   [x]Reduced participation in home management activities   []Reduced participation in work activities   [x]Reduced participation in social activities. []Reduced participation in sport activities. Classification :    []Signs/symptoms consistent with post-surgical status including decreased ROM, strength and function.    [x]Signs/symptoms consistent with joint sprain/strain   []Signs/symptoms consistent with patella-femoral syndrome   []Signs/symptoms consistent with knee OA/hip OA   []Signs/symptoms consistent with internal derangement of knee/Hip   [x]Signs/symptoms consistent with functional hip weakness/NMR control      []Signs/symptoms consistent with tendinitis/tendinosis    [x]signs/symptoms consistent with pathology which may benefit from Dry needling      []other:      Prognosis/Rehab Potential:      [x]Excellent   []Good    []Fair   []Poor    Tolerance of evaluation/treatment:    [x]Excellent   []Good    []Fair   []Poor    Physical Therapy Evaluation Complexity Justification  [x] A history of present problem with:  [x] no personal factors and/or comorbidities that impact the plan of care;  []1-2 personal factors and/or comorbidities that impact the plan of care  []3 personal factors and/or comorbidities that impact the plan of care  [x] An examination of body systems using standardized tests and measures addressing any of the following: body structures and functions (impairments), activity limitations, and/or participation restrictions;:  [x] a total of 1-2 or more elements   [] a total of 3 or more elements   [] a total of 4 or more elements   [x] A clinical presentation with:  [x] stable and/or uncomplicated characteristics   [] evolving clinical presentation with changing characteristics  [] unstable and unpredictable characteristics;   [x] Clinical decision making of [x] low, [] moderate, [] high complexity using standardized patient assessment instrument and/or measurable assessment of functional outcome. [x] EVAL (LOW) 65841 (typically 20 minutes face-to-face)  [] EVAL (MOD) 75068 (typically 30 minutes face-to-face)  [] EVAL (HIGH) 67503 (typically 45 minutes face-to-face)  [] RE-EVAL     PLAN:  Frequency/Duration:  2 days per week for 4 Weeks:  Interventions:  [x]  Therapeutic exercise including: strength training, ROM, for Lower extremity and core   [x]  NMR activation and proprioception for LE, Glutes and Core   [x]  Manual therapy as indicated for LE, Hip and spine to include: Dry Needling/IASTM, STM, PROM, Gr I-IV mobilizations, manipulation. [x] Modalities as needed that may include: thermal agents, E-stim, Biofeedback, US, iontophoresis as indicated  [x] Patient education on joint protection, postural re-education, activity modification, progression of HEP. HEP instruction: Supine piriformis stretch, supine hip ER stretch (see scanned forms)    GOALS:  Patient stated goal: pain-free, return to PLOF    Therapist goals for Patient:   Short Term Goals: To be achieved in: 2 weeks  1. Independent in HEP and progression per patient tolerance, in order to prevent re-injury. 2. Patient will have a decrease in pain to facilitate improvement in movement, function, and ADLs as indicated by Functional Deficits. Long Term Goals:  To be achieved in: 4 weeks  1. Disability index score of 20% or less for the LEFS to assist with reaching prior level of function. 2. Patient will demonstrate increased AROM to equal contralateral to allow for proper joint functioning as indicated by patients Functional Deficits. 3. Patient will demonstrate an increase in Strength to good proximal hip strength and control, within 5lb HHD in LE to allow for proper functional mobility as indicated by patients Functional Deficits. 4. Patient will return to driving, squatting, prolonged ambulation, ascending stairs  functional activities without increased symptoms or restriction.         Electronically signed by:  Lu Reynoso, PT

## 2019-06-21 NOTE — FLOWSHEET NOTE
[facility] [address] [address]  Phone: [phone] Fax: [phone]    Physical Therapy Daily Treatment Note  Date:  2019    Patient Name:  Tereso Hong    :  1945  MRN: [id number]  Restrictions/Precautions:    Medical/Treatment Diagnosis Information:  · Diagnosis: Right knee pain, right knee osteoarthritis  · Treatment Diagnosis: M25.561, I21.40  Insurance/Certification information:  PT Insurance Information: Aetna  Physician Information:  Referring Practitioner: Bryon Hawk IV  Plan of care signed (Y/N):     Date of Patient follow up with Physician:     G-Code (if applicable):      Date G-Code Applied:         Progress Note: [x]  Yes  []  No  Next due by: Visit #10       Latex Allergy:  [x]NO      []YES  Preferred Language for Healthcare:   [x]English       []other:    Visit # Insurance Allowable   1 Medical necessity     Pain level:  4/10     SUBJECTIVE:  See eval    OBJECTIVE: See eval  Observation:   Test measurements:      RESTRICTIONS/PRECAUTIONS: None    Exercises/Interventions:     Therapeutic Ex (88597) - 10 min Sets/sec Reps Notes/CUES   Retro Stepper/BIKE      Supine figure 4 piriformis stretch 30 3    Supine figure 4 ER stretch 30 3    Sportco      3 way SLR      SAQ      Clam ABD      Hip Ext Lady Lofty      BOSU fwd/side lunge      BOSU squat      Leg Press Iso/Con/Ecc 0-      Cybex HS curl      TKE      Glute side walks      RDL      Slide Lunge      Slide HS eccentrics      Step ups/ecc step down      Swissball wall rolls- in SLS- hip drive      Quad hip ext/wall-ball rolls                  Manual Intervention (58746) - 14 min      Knee mobs/PROM      Tib/Fem Mobs      IT band Mobs 4'     IASTM 10'  Right IT band, VL, lateral HS               NMR re-education (64830)   CUES NEEDED   Macedonian/Biofeedback 10/10      BFR      G. Med activation      Hip Ext full ROM/ G.  Activation      Bosu Bal and mobilize LE, proximal hip and/or LS spine soft tissue/joints for the purpose of modulating pain, promoting relaxation,  increasing ROM, reducing/eliminating soft tissue swelling/inflammation/restriction, improving soft tissue extensibility and allowing for proper ROM for normal function with self care, mobility, lifting and ambulation. Modalities:     [] GAME READY (VASO)- for significant edema, swelling, pain control. Charges:  Timed Code Treatment Minutes: 24   Total Treatment Minutes: 50      [x] EVAL (LOW) 35110 (typically 20 minutes face-to-face)  [] EVAL (MOD) 32274 (typically 30 minutes face-to-face)  [] EVAL (HIGH) 15864 (typically 45 minutes face-to-face)  [] RE-EVAL     [x] NK(00035) x  1   [] IONTO  [] NMR (50910) x      [] VASO  [x] Manual (95992) x  1    [] Other:  [] TA x       [] Mech Traction (36679)  [] ES(attended) (03351)      [] ES (un) (80582):     GOALS:  Patient stated goal: pain-free, return to PLOF    Therapist goals for Patient:   Short Term Goals: To be achieved in: 2 weeks  1. Independent in HEP and progression per patient tolerance, in order to prevent re-injury. 2. Patient will have a decrease in pain to facilitate improvement in movement, function, and ADLs as indicated by Functional Deficits. Long Term Goals: To be achieved in: 4 weeks  1. Disability index score of 20% or less for the LEFS to assist with reaching prior level of function. 2. Patient will demonstrate increased AROM to equal contralateral to allow for proper joint functioning as indicated by patients Functional Deficits. 3. Patient will demonstrate an increase in Strength to good proximal hip strength and control, within 5lb HHD in LE to allow for proper functional mobility as indicated by patients Functional Deficits. 4. Patient will return to driving, squatting, prolonged ambulation, ascending stairs  functional activities without increased symptoms or restriction.      Progression Towards Functional goals:  [] Patient is progressing as expected towards functional goals listed. [] Progression is slowed due to complexities listed. [] Progression has been slowed due to co-morbidities.   [x] Plan just implemented, too soon to assess goals progression  [] Other:     ASSESSMENT:  See eval    Return to Play: (if applicable)   []  Stage 1: Intro to Strength   []  Stage 2: Return to Run and Strength   []  Stage 3: Return to Jump and Strength   []  Stage 4: Dynamic Strength and Agility   []  Stage 5: Sport Specific Training     []  Ready to Return to Play, Meets All Above Stages   []  Not Ready for Return to Sports   Comments:                         Treatment/Activity Tolerance:  [x] Patient tolerated treatment well [] Patient limited by fatique  [] Patient limited by pain  [] Patient limited by other medical complications  [] Other:     Prognosis: [x] Good [] Fair  [] Poor    Patient Requires Follow-up: [x] Yes  [] No      PLAN: See francisco  [] Continue per plan of care [] Alter current plan (see comments)  [x] Plan of care initiated [] Hold pending MD visit [] Discharge    Electronically signed by: Aris Diaz PT

## 2019-06-24 ENCOUNTER — HOSPITAL ENCOUNTER (OUTPATIENT)
Dept: PHYSICAL THERAPY | Age: 74
Setting detail: THERAPIES SERIES
Discharge: HOME | End: 2019-06-24
Payer: MEDICARE

## 2019-06-24 PROCEDURE — 97140 MANUAL THERAPY 1/> REGIONS: CPT

## 2019-06-24 PROCEDURE — 97110 THERAPEUTIC EXERCISES: CPT

## 2019-06-26 NOTE — FLOWSHEET NOTE
[place] [address] [address]  Phone: [phone] Fax: [phone]    Physical Therapy Daily Treatment Note  Date:  2019    Patient Name:  Tereso Hong    :  1945  MRN: [id number]  Restrictions/Precautions:    Medical/Treatment Diagnosis Information:  · Diagnosis: Right knee pain, right knee osteoarthritis  · Treatment Diagnosis: M25.561, M28.64  Insurance/Certification information:  PT Insurance Information: Aetna  Physician Information:  Referring Practitioner: Rae Fish9 of care signed (Y/N):     Date of Patient follow up with Physician:     G-Code (if applicable):      Date G-Code Applied:         Progress Note: [x]  Yes  []  No  Next due by: Visit #10       Latex Allergy:  [x]NO      []YES  Preferred Language for Healthcare:   [x]English       []other:    Visit # Insurance Allowable   2 Medical necessity     Pain level:  3/10     SUBJECTIVE:  Patient reports that his right knee and lateral thigh pain did improve following his initial evaluation, feels that the IASTM definitely helped. Has been doing the stretches and they seem to be helping as well. Still feeling some discomfort with ascending stairs and squatting.      OBJECTIVE: See eval  Observation:   Test measurements:      RESTRICTIONS/PRECAUTIONS: None    Exercises/Interventions:     Therapeutic Ex (79711) - 24 min Sets/sec Reps Notes/CUES   Retro Stepper/BIKE      Supine figure 4 piriformis stretch 30 3    Supine figure 4 ER stretch 30 3    Sportcord March      3 way SLR      Bridge 10 10    Clam ABD 2 10 0#   Hip Ext Lady Lofty      BOSU fwd/side lunge      BOSU squat      Leg Press Iso/Con/Ecc 0-      Cybex HS curl      TKE      Glute side walks      RDL      Slide Lunge      Slide HS eccentrics      Step ups/ecc step down 2 10 6-8\", bilat UE support   Swissball wall rolls- in SLS- hip drive      Quad hip ext/wall-ball rolls Manual Intervention (91997) - 18 min      Knee mobs/PROM      Tib/Fem Mobs      IT band Mobs 4'     IASTM 14'  Right IT band, VL, lateral HS               NMR re-education (23725)   CUES NEEDED   Luxembourger/Biofeedback 10/10      BFR      G. Med activation      Hip Ext full ROM/ G. Activation      Bosu Bal and Prop- G Med      Single leg stance/Balance/Prop      Bosu Retro G. Med act      Prone Hip froggers- sliders/elevated            Therapeutic Activity (48140)      Ladders      Plyos      Dynamic Balance                            Therapeutic Exercise and NMR EXR  [x] (89157) Provided verbal/tactile cueing for activities related to strengthening, flexibility, endurance, ROM for improvements in LE, proximal hip, and core control with self care, mobility, lifting, ambulation. [x] (33568) Provided verbal/tactile cueing for activities related to improving balance, coordination, kinesthetic sense, posture, motor skill, proprioception  to assist with LE, proximal hip, and core control in self care, mobility, lifting, ambulation and eccentric single leg control.      NMR and Therapeutic Activities:    [x] (13320 or 65649) Provided verbal/tactile cueing for activities related to improving balance, coordination, kinesthetic sense, posture, motor skill, proprioception and motor activation to allow for proper function of core, proximal hip and LE with self care and ADLs and functional mobility.   [] (74022) Gait Re-education- Provided training and instruction to the patient for proper LE, core and proximal hip recruitment and positioning and eccentric body weight control with ambulation re-education including up and down stairs     Home Exercise Program:    [x] (52294) Reviewed/Progressed HEP activities related to strengthening, flexibility, endurance, ROM of core, proximal hip and LE for functional self-care, mobility, lifting and ambulation/stair navigation   [] (47628)Reviewed/Progressed HEP activities related to strength and control, within 5lb HHD in LE to allow for proper functional mobility as indicated by patients Functional Deficits. 4. Patient will return to driving, squatting, prolonged ambulation, ascending stairs  functional activities without increased symptoms or restriction. Progression Towards Functional goals:  [x] Patient is progressing as expected towards functional goals listed. [] Progression is slowed due to complexities listed. [] Progression has been slowed due to co-morbidities. [] Plan just implemented, too soon to assess goals progression  [] Other:     ASSESSMENT:  Patient reported improvement in symptoms at conclusion. Soft tissue restrictions still present throughout right IT band, VL, and lateral HS. Continued deficits in proximal hip control, improvement in symptoms with step ups with cues to avoid knee valgus.      Return to Play: (if applicable)   []  Stage 1: Intro to Strength   []  Stage 2: Return to Run and Strength   []  Stage 3: Return to Jump and Strength   []  Stage 4: Dynamic Strength and Agility   []  Stage 5: Sport Specific Training     []  Ready to Return to Play, Meets All Above Stages   []  Not Ready for Return to Sports   Comments:                         Treatment/Activity Tolerance:  [x] Patient tolerated treatment well [] Patient limited by fatique  [] Patient limited by pain  [] Patient limited by other medical complications  [] Other:     Prognosis: [x] Good [] Fair  [] Poor    Patient Requires Follow-up: [x] Yes  [] No      PLAN: See eval  [x] Continue per plan of care [] Alter current plan (see comments)  [] Plan of care initiated [] Hold pending MD visit [] Discharge    Electronically signed by: Rock Granado PT

## 2019-06-28 ENCOUNTER — APPOINTMENT (OUTPATIENT)
Dept: PHYSICAL THERAPY | Age: 74
End: 2019-06-28
Payer: MEDICARE

## 2019-07-01 ENCOUNTER — HOSPITAL ENCOUNTER (OUTPATIENT)
Dept: PHYSICAL THERAPY | Age: 74
Setting detail: THERAPIES SERIES
Discharge: HOME | End: 2019-07-01
Payer: MEDICARE

## 2019-07-01 PROCEDURE — 97140 MANUAL THERAPY 1/> REGIONS: CPT

## 2019-07-01 PROCEDURE — 97110 THERAPEUTIC EXERCISES: CPT

## 2019-07-01 NOTE — FLOWSHEET NOTE
[place] [address] [address]  Phone: [phone] Fax: [phone]    Physical Therapy Daily Treatment Note  Date:  2019    Patient Name:  Dom Torrez    :  1945  MRN: [id number]  Restrictions/Precautions:    Medical/Treatment Diagnosis Information:  · Diagnosis: Right knee pain, right knee osteoarthritis  · Treatment Diagnosis: M25.561, G55.29  Insurance/Certification information:  PT Insurance Information: Aetna  Physician Information:  Referring Practitioner: Stephany Light IV  Plan of care signed (Y/N):     Date of Patient follow up with Physician:     G-Code (if applicable):      Date G-Code Applied:         Progress Note: [x]  Yes  []  No  Next due by: Visit #10       Latex Allergy:  [x]NO      []YES  Preferred Language for Healthcare:   [x]English       []other:    Visit # Insurance Allowable   3 Medical necessity     Pain level:  3/10     SUBJECTIVE:  Patient reports that his knee and lateral thigh pain is definitely better, though he did feel some lateral knee pain after going fishing twice over the weekend. States that wading/walking through creeks aggravated it a little bit. IASTM felt great at last session.     OBJECTIVE: See eval  Observation:   Test measurements:      RESTRICTIONS/PRECAUTIONS: None    Exercises/Interventions:     Therapeutic Ex (98061) - 10 min Sets/sec Reps Notes/CUES   BIKE 5'     Supine figure 4 piriformis stretch 30 3    Supine figure 4 ER stretch 30 3    Sportcord March      3 way SLR      Bridge 10 10    Clam ABD 2 10 0#   Hip Ext Brittaney Ores      BOSU fwd/side lunge      BOSU squat      Leg Press Iso/Con/Ecc 0-      Cybex HS curl      TKE      Glute side walks      RDL      Slide Lunge      Slide HS eccentrics      Step ups/ecc step down 2 10 6-8\", bilat UE support   Swissball wall rolls- in SLS- hip drive      Quad hip ext/wall-ball rolls                  Manual Intervention (05044) - HHD in LE to allow for proper functional mobility as indicated by patients Functional Deficits. 4. Patient will return to driving, squatting, prolonged ambulation, ascending stairs  functional activities without increased symptoms or restriction. Progression Towards Functional goals:  [x] Patient is progressing as expected towards functional goals listed. [] Progression is slowed due to complexities listed. [] Progression has been slowed due to co-morbidities. [] Plan just implemented, too soon to assess goals progression  [] Other:     ASSESSMENT:  Patient reported improvement in symptoms at conclusion. Soft tissue restrictions still present throughout right IT band, VL, and lateral HS. Continued deficits in proximal hip control.     Return to Play: (if applicable)   []  Stage 1: Intro to Strength   []  Stage 2: Return to Run and Strength   []  Stage 3: Return to Jump and Strength   []  Stage 4: Dynamic Strength and Agility   []  Stage 5: Sport Specific Training     []  Ready to Return to Play, Meets All Above Stages   []  Not Ready for Return to Sports   Comments:                         Treatment/Activity Tolerance:  [x] Patient tolerated treatment well [] Patient limited by fatique  [] Patient limited by pain  [] Patient limited by other medical complications  [] Other:     Prognosis: [x] Good [] Fair  [] Poor    Patient Requires Follow-up: [x] Yes  [] No      PLAN: See eval  [x] Continue per plan of care [] Alter current plan (see comments)  [] Plan of care initiated [] Hold pending MD visit [] Discharge    Electronically signed by: Pino Phillips PT

## 2021-04-12 ENCOUNTER — OFFICE VISIT (OUTPATIENT)
Dept: ORTHOPEDIC SURGERY | Age: 76
End: 2021-04-12
Payer: MEDICARE

## 2021-04-12 VITALS — HEIGHT: 73 IN | BODY MASS INDEX: 29.16 KG/M2 | TEMPERATURE: 97.6 F | WEIGHT: 220 LBS

## 2021-04-12 DIAGNOSIS — M17.12 PRIMARY OSTEOARTHRITIS OF LEFT KNEE: Primary | ICD-10-CM

## 2021-04-12 DIAGNOSIS — M25.562 LEFT KNEE PAIN, UNSPECIFIED CHRONICITY: ICD-10-CM

## 2021-04-12 PROCEDURE — 99213 OFFICE O/P EST LOW 20 MIN: CPT | Performed by: ORTHOPAEDIC SURGERY

## 2021-04-12 NOTE — PROGRESS NOTES
2001       Social History     Tobacco Use    Smoking status: Former Smoker     Quit date: 1986     Years since quittin.3    Smokeless tobacco: Never Used   Substance Use Topics    Alcohol use: Yes     Comment: Occasional     Drug use: Not on file        Family History:  family history includes High Blood Pressure in his mother. Current Outpatient Medications:     simvastatin (ZOCOR) 10 MG tablet, Take 10 mg by mouth nightly, Disp: , Rfl:     aspirin 81 MG tablet, Take 81 mg by mouth daily. , Disp: , Rfl:       No Known Allergies      Review of Systems: A 14 point review of systems was completed by the patient on 21 and is available in the media section of the scanned medical record and was reviewed today  The review is negative with the exception of those things mentioned in the HPI    No notes on file    Physical Exam:  Temp 97.6 °F (36.4 °C)   Ht 6' 1\" (1.854 m)   Wt 220 lb (99.8 kg)   BMI 29.03 kg/m²       General: No acute distress, well nourished  CV: No obvious peripheral edema. Normal peripheral pulses  Resp: nonlabored respiration, symmetrical chest expansion  Neuro: Alert & oriented x 3  Psych: Appropriate mood and affect      L  knee exam    Gait: No use of assistive devices. No antalgic gait. Alignment: normal alignment. Inspection/skin: Skin is intact without erythema or ecchymosis. No gross deformity. Incision sites well healed without surrounding erythema. Palpation: PFcrepitus. no joint line tenderness present. Range of Motion: There is full range of motion. Strength: Normal quadriceps development. Effusion: mild to moderate effusion      Ligamentous stability: No cruciate or collateral ligament instability. Neurologic and vascular: Skin is warm and well-perfused. Sensation is intact to light-touch. Special tests: Negative Edis sign. Comparison R knee exam    Gait: No use of assistive devices. No antalgic gait.     Alignment: normal alignment. Inspection/skin: Skin is intact without erythema or ecchymosis. No gross deformity. Palpation: mild crepitus. no joint line tenderness present. Range of Motion: There is full range of motion. Strength: Normal quadriceps development. Effusion: No effusion or swelling present. Ligamentous stability: No cruciate or collateral ligament instability. Neurologic and vascular: Skin is warm and well-perfused. Sensation is intact to light-touch. Special tests: Negative Edis sign. Radiographic:  X-rays obtained and reviewed in office, reviewed and interpreted by me today:  3 views Bilateral AP, bilateral merchant, lateral left knee: Slight lateral joint space narrowing on the left. Bilateral lateral patellar tilt noted with lateral osteophytes noted. Assessment:  Xavier Burgess is a 77 y.o. male with left knee primary osteoarthritis primarily involving the patellofemoral and lateral compartment    Impression:  Encounter Diagnosis   Name Primary?  Left knee pain, unspecified chronicity Yes       Office Procedures:  Orders Placed This Encounter   Procedures    XR KNEE LEFT (3 VIEWS)     Standing Status:   Future     Number of Occurrences:   1     Standing Expiration Date:   4/12/2022     Order Specific Question:   Reason for exam:     Answer:   Pain       Plan:   - Pertinent imaging was reviewed. The etiology, natural history, and treatment options for the disorder were discussed. The roles of activity modifications, medications, injections, physical therapy, and surgical interventions were all described to the patient and questions were answered.   -He would benefit from physical therapy to work on quad strengthening, abductor strengthening, IT band/hamstring stretches for his patellofemoral arthritis-he will be going to the [facility]  -Prescription provided for diclofenac twice daily  - If he continues to have knee pain at follow up, he could benefit from a steroid injection   -Return in 4 to 6 weeks for follow-up    MD Eric Gan [facility] [address] [facility]      The encounter with Krissy Barrios was supervised by Dr Mega Esparza who personally examined the patient and reviewed the plan. This dictation was performed with a verbal recognition program (DRAGON) and it was checked for errors. It is possible that there are still dictated errors within this office note. If so, please bring any errors to my attention for an addendum. All efforts were made to ensure that this office note is accurate.

## 2021-04-15 ENCOUNTER — HOSPITAL ENCOUNTER (OUTPATIENT)
Dept: PHYSICAL THERAPY | Age: 76
Setting detail: THERAPIES SERIES
Discharge: HOME | End: 2021-04-15
Payer: MEDICARE

## 2021-04-15 PROCEDURE — 97110 THERAPEUTIC EXERCISES: CPT

## 2021-04-15 PROCEDURE — 97161 PT EVAL LOW COMPLEX 20 MIN: CPT

## 2021-04-15 NOTE — FLOWSHEET NOTE
[facility]    Physical Therapy Treatment Note/ Progress Report:     Date:  4/15/2021    Patient Name:  Alec Whitney    :    MRN: [id number]  Medical/Treatment Diagnosis Information:  · Diagnosis: Primary OA of L knee  · Treatment Diagnosis: L knee pain  Insurance/Certification information:     Physician Information:  Referring Practitioner: Isis Menon M.D. Plan of care signed (Y/N):     Date of Patient follow up with Physician:      Progress Report: []  Yes  []  No      Functional Scale:    Date:    Date Range for reporting period:  Beginnin/15/21  Ending:      Progress report due (10 Rx/or 30 days whichever is less): [email or web address]     Recertification due (POC duration/ or 90 days whichever is less): 7/15/21     Visit # Insurance Allowable Auth Needed   1 M.N.  []Yes    []No     Pain level:  1-2/10     SUBJECTIVE:  See eval    OBJECTIVE: See eval   Observation:    Test measurements:      RESTRICTIONS/PRECAUTIONS: Reduced L knee flexion, hamstring strength and flexibility.      Exercises/Interventions:     Therapeutic Ex Resistance Sets/sec Reps Notes          Long sitting hamstring stretch  30' 3 bilateral   Prone hamstring curl  2 10    Bridges with TB blue 1 10                                                              Therapeutic Activities                                                               Manual Intervention       Knee mobs/PROM       Tib/Fem Mobs       Patella Mobs       Ankle mobs                     NMR re-education                                                                          Therapeutic Exercise and NMR EXR  [x] (78834) Provided verbal/tactile cueing for activities related to strengthening, flexibility, endurance, ROM for improvements in LE, proximal hip, and core control with self care, mobility, lifting, ambulation.  [] (64466) Provided verbal/tactile cueing for activities related to improving balance, coordination, kinesthetic sense, posture, motor skill, proprioception  to assist with LE, proximal hip, and core control in self care, mobility, lifting, ambulation and eccentric single leg control. NMR and Therapeutic Activities:    [] (51418 or 32276) Provided verbal/tactile cueing for activities related to improving balance, coordination, kinesthetic sense, posture, motor skill, proprioception and motor activation to allow for proper function of core, proximal hip and LE with self care and ADLs  [] (85654) Gait Re-education- Provided training and instruction to the patient for proper LE, core and proximal hip recruitment and positioning and eccentric body weight control with ambulation re-education including up and down stairs     Home Exercise Program:    [x] (89622) Reviewed/Progressed HEP activities related to strengthening, flexibility, endurance, ROM of core, proximal hip and LE for functional self-care, mobility, lifting and ambulation/stair navigation   [] (59939)Reviewed/Progressed HEP activities related to improving balance, coordination, kinesthetic sense, posture, motor skill, proprioception of core, proximal hip and LE for self care, mobility, lifting, and ambulation/stair navigation      Manual Treatments:  PROM / STM / Oscillations-Mobs:  G-I, II, III, IV (PA's, Inf., Post.)  [] (12020) Provided manual therapy to mobilize LE, proximal hip and/or LS spine soft tissue/joints for the purpose of modulating pain, promoting relaxation,  increasing ROM, reducing/eliminating soft tissue swelling/inflammation/restriction, improving soft tissue extensibility and allowing for proper ROM for normal function with self care, mobility, lifting and ambulation.      Modalities:      Charges:  Timed Code Treatment Minutes: 15   Total Treatment Minutes: 60       [x] EVAL (LOW) 17882 (typically 20 minutes face-to-face)  [] EVAL (MOD) 94750 (typically 30 minutes face-to-face)  [] EVAL (HIGH) 423 8834 (typically 45 minutes face-to-face)  [] RE-EVAL     [x] LL(06777) x   1  [] IONTO (95771)  [] NMR (80632) x     [] VASO (26464)  [] Manual (57530) x     [] Other:  [] TA (36816)x     [] Mech Traction (61410)  [] ES(attended) (55160)     [] ES (un) (28931): If BWC Please Indicate Time In/Out and Total Minutes  CPT Code Time in Time out Total Min                                         GOALS:  Patient stated goal: To return to recreational activities without pain. []? Progressing: []? Met: []? Not Met: []? Adjusted     Therapist goals for Patient:   Short Term Goals: To be achieved in: 2 weeks  1. Independent in HEP and progression per patient tolerance, in order to prevent re-injury. []? Progressing: []? Met: []? Not Met: []? Adjusted  2. Patient will have a decrease in pain to facilitate improvement in movement, function, and ADLs as indicated by Functional Deficits. []? Progressing: []? Met: []? Not Met: []? Adjusted     Long Term Goals: To be achieved in: 4-6 weeks  1. Disability index score of 77 or more for the LEFS to assist with reaching prior level of function. []? Progressing: []? Met: []? Not Met: []? Adjusted  2. Patient will demonstrate increased AROM to WNL as compared to R to allow for proper joint functioning as indicated by patients Functional Deficits. []? Progressing: []? Met: []? Not Met: []? Adjusted  3. Patient will demonstrate an increase in Strength to good proximal hip strength and control, within 5lb HHD in LE to allow for proper functional mobility as indicated by patients Functional Deficits. []? Progressing: []? Met: []? Not Met: []? Adjusted  4. Patient will return to squatting, ambulating, ascending/decsending stairs, kneeling, and all functional activities without increased symptoms or restriction. []? Progressing: []? Met: []? Not Met: []? Adjusted  5. To return to recreational activities without pain.(patient specific functional goal)    []? Progressing: []? Met: []?  Not Met: []?

## 2021-04-15 NOTE — PLAN OF CARE
[place] [address]  Phone [phone]   Fax [phone]                                                       Physical Therapy Certification    Dear Referring Practitioner: Сергей Bateman M.D.,    We had the pleasure of evaluating the following patient for physical therapy services at [address] [place] [address]. A summary of our findings can be found in the initial assessment below. This includes our plan of care. If you have any questions or concerns regarding these findings, please do not hesitate to contact me at the office phone number checked above. Thank you for the referral.       Physician Signature:_______________________________Date:__________________  By signing above (or electronic signature), therapists plan is approved by physician      Patient: Avel Delgadillo   : 1945   MRN: [id number]  Referring Physician: Referring Practitioner: Сергей Bateman M.D.       Evaluation Date: 4/15/2021      Medical Diagnosis Information:  Diagnosis: Primary OA of L knee   Treatment Diagnosis: L knee pain                                         Insurance information:       Precautions/ Contra-indications: HTN    C-SSRS Triggered by Intake questionnaire (Past 2 wk assessment):   [x] No, Questionnaire did not trigger screening.   [] Yes, Patient intake triggered further evaluation      [] C-SSRS Screening completed  [] PCP notified via Plan of Care  [] Emergency services notified     Latex Allergy:  [x]NO      []YES  Preferred Language for Healthcare:   [x]English       []other:    SUBJECTIVE: Patient stated complaint:See body chart    Relevant Medical History:HTN, L knee arthroscope,   Functional Disability Index: LEFS: 68/80    Pain Scale: 1-2/10  Easing factors: See body chart  Provocative factors: see body chart     Type: [x]Constant scanned into medical record. Patient has been instructed to contact their primary care physician regarding ROS issues if not already being addressed at this time. Co-morbidities/Complexities (which will affect course of rehabilitation):   []None           Arthritic conditions   []Rheumatoid arthritis (M05.9)  []Osteoarthritis (M19.91)   Cardiovascular conditions   [x]Hypertension (I10)  []Hyperlipidemia (E78.5)  []Angina pectoris (I20)  []Atherosclerosis (I70)   Musculoskeletal conditions   []Disc pathology   []Congenital spine pathologies   [x]Prior surgical intervention of L knee  []Osteoporosis (M81.8)  []Osteopenia (M85.8)   Endocrine conditions   []Hypothyroid (E03.9)  []Hyperthyroid Gastrointestinal conditions   []Constipation (I08.08)   Metabolic conditions   []Morbid obesity (E66.01)  []Diabetes type 1(E10.65) or 2 (E11.65)   []Neuropathy (G60.9)     Pulmonary conditions   []Asthma (J45)  []Coughing   []COPD (J44.9)   Psychological Disorders  []Anxiety (F41.9)  []Depression (F32.9)   []Other:   []Other:          Barriers to/and or personal factors that will affect rehab potential:              []Age  []Sex              []Motivation/Lack of Motivation                        []Co-Morbidities              []Cognitive Function, education/learning barriers              []Environmental, home barriers              []profession/work barriers  []past PT/medical experience  []other:  Justification:     Falls Risk Assessment (30 days):   [x] Falls Risk assessed and no intervention required. [] Falls Risk assessed and Patient requires intervention due to being higher risk   TUG score (>12s at risk):     [] Falls education provided, including         ASSESSMENT: Patient is a 77year old male who presents with L knee pain 2/10 that he describes as dull that began approximately 9 days ago. Patient has a prior history of L knee arthroscopic surgical intervention.  Patient reports pain with walking, ascending/descending stairs, squatting, and kneeling on L knee. Patient displayed reduced hamstring flexibility bilaterally, reduced proximal hip strength bialaterally, and reduced L knee flexion AROM. Patient reports that his L knee pain has progressively improved with rest and NSAID use. Patient tolerated session well and was educated on activity modification and to remain active as long as the activity is pain free. Patient voiced understanding. Patient provided with HEP during session. Functional Impairments:    []Noted lumbar/proximal hip/LE joint hypomobility   [x]Decreased LE functional ROM   [x]Decreased core/proximal hip strength and neuromuscular control   []Decreased LE functional strength   [x]Reduced balance/proprioceptive control   []other:      Functional Activity Limitations (from functional questionnaire and intake)   []Reduced ability to tolerate prolonged functional positions   []Reduced ability or difficulty with changes of positions or transfers between positions   []Reduced ability to maintain good posture and demonstrate good body mechanics with sitting, bending, and lifting   []Reduced ability to sleep   [] Reduced ability or tolerance with driving and/or computer work   []Reduced ability to perform lifting, carrying tasks   [x]Reduced ability to squat   []Reduced ability to forward bend   []Reduced ability to ambulate prolonged functional periods/distances/surfaces   [x]Reduced ability to ascend/descend stairs   [x]Reduced ability to run, hop, cut or jump   [x]other: Participate in pickle ball and fly fishing    Participation Restrictions   []Reduced participation in self care activities   []Reduced participation in home management activities   []Reduced participation in work activities   [x]Reduced participation in social activities. [x]Reduced participation in sport/recreation activities.     Classification :    []Signs/symptoms consistent with post-surgical status including decreased ROM, strength and function. []Signs/symptoms consistent with joint sprain/strain   []Signs/symptoms consistent with patella-femoral syndrome   [x]Signs/symptoms consistent with knee OA/hip OA   []Signs/symptoms consistent with internal derangement of knee/Hip   []Signs/symptoms consistent with functional hip weakness/NMR control      []Signs/symptoms consistent with tendinitis/tendinosis    []signs/symptoms consistent with pathology which may benefit from Dry needling      []other:      Prognosis/Rehab Potential:      [x]Excellent   []Good    []Fair   []Poor    Tolerance of evaluation/treatment:    [x]Excellent   []Good    []Fair   []Poor    Physical Therapy Evaluation Complexity Justification  [x] A history of present problem with:  [] no personal factors and/or comorbidities that impact the plan of care;  [x]1-2 personal factors and/or comorbidities that impact the plan of care  []3 personal factors and/or comorbidities that impact the plan of care  [x] An examination of body systems using standardized tests and measures addressing any of the following: body structures and functions (impairments), activity limitations, and/or participation restrictions;:  [x] a total of 1-2 or more elements   [] a total of 3 or more elements   [] a total of 4 or more elements   [x] A clinical presentation with:  [x] stable and/or uncomplicated characteristics   [] evolving clinical presentation with changing characteristics  [] unstable and unpredictable characteristics;   [x] Clinical decision making of [x] low, [] moderate, [] high complexity using standardized patient assessment instrument and/or measurable assessment of functional outcome.     [x] EVAL (LOW) 66556 (typically 30 minutes face-to-face)  [] EVAL (MOD) 56864 (typically 30 minutes face-to-face)  [] EVAL (HIGH) 44185 (typically 45 minutes face-to-face)  [] RE-EVAL     PLAN:   Frequency/Duration:  1-2 days per week for 4-6 Weeks:  Interventions:  [x]  Therapeutic exercise including: strength training, ROM, for Lower extremity and core   [x]  NMR activation and proprioception for LE, Glutes and Core   [x]  Manual therapy as indicated for LE, Hip and spine to include: Dry Needling/IASTM, STM, PROM, Gr I-IV mobilizations, manipulation. [x] Modalities as needed that may include: thermal agents, E-stim, Biofeedback, US, iontophoresis as indicated  [x] Patient education on joint protection, postural re-education, activity modification, progression of HEP. HEP instruction: (see scanned forms)    GOALS:  Patient stated goal: To return to recreational activities without pain. [] Progressing: [] Met: [] Not Met: [] Adjusted    Therapist goals for Patient:   Short Term Goals: To be achieved in: 2 weeks  1. Independent in HEP and progression per patient tolerance, in order to prevent re-injury. [] Progressing: [] Met: [] Not Met: [] Adjusted  2. Patient will have a decrease in pain to facilitate improvement in movement, function, and ADLs as indicated by Functional Deficits. [] Progressing: [] Met: [] Not Met: [] Adjusted    Long Term Goals: To be achieved in: 4-6 weeks  1. Disability index score of 77 or more for the LEFS to assist with reaching prior level of function. [] Progressing: [] Met: [] Not Met: [] Adjusted  2. Patient will demonstrate increased AROM to WNL as compared to R to allow for proper joint functioning as indicated by patients Functional Deficits. [] Progressing: [] Met: [] Not Met: [] Adjusted  3. Patient will demonstrate an increase in Strength to good proximal hip strength and control, within 5lb HHD in LE to allow for proper functional mobility as indicated by patients Functional Deficits. [] Progressing: [] Met: [] Not Met: [] Adjusted  4. Patient will return to squatting, ambulating, ascending/decsending stairs, kneeling, and all functional activities without increased symptoms or restriction. [] Progressing: [] Met: [] Not Met: [] Adjusted  5.  To return to recreational activities without pain.(patient specific functional goal)    [] Progressing: [] Met: [] Not Met: [] Adjusted     Electronically signed by:  Rohan Menendez PT

## 2021-04-20 ENCOUNTER — HOSPITAL ENCOUNTER (OUTPATIENT)
Dept: PHYSICAL THERAPY | Age: 76
Setting detail: THERAPIES SERIES
Discharge: HOME | End: 2021-04-20
Payer: MEDICARE

## 2021-04-20 PROCEDURE — 97110 THERAPEUTIC EXERCISES: CPT

## 2021-04-20 NOTE — FLOWSHEET NOTE
[facility]    Physical Therapy Treatment Note/ Progress Report:     Date:  2021    Patient Name:  Timo Ortega    :    MRN: [id number]  Medical/Treatment Diagnosis Information:    Diagnosis: Primary OA of L knee    Treatment Diagnosis: L knee pain  Insurance/Certification information:     Physician Information:     Plan of care signed (Y/N):     Date of Patient follow up with Physician:      Progress Report: []  Yes  []  No      Functional Scale:    Date:    Date Range for reporting period:  Beginnin/15/21  Ending:      Progress report due (10 Rx/or 30 days whichever is less): [email or web address]     Recertification due (POC duration/ or 90 days whichever is less): 7/15/21     Visit # Insurance Allowable Auth Needed   1 M.N.  []Yes    []No     Pain level:  0/10     SUBJECTIVE:  Patient reports that he believes that he no longer requires NSAIDs. Patient reports he believes he is currently returned to baseline function. Patient states he returned to recreational activity as well. OBJECTIVE: See eval   Observation:    Test measurements:     negative side effects   RESTRICTIONS/PRECAUTIONS: Reduced L knee flexion, hamstring strength and flexibility.      Exercises/Interventions:     Therapeutic Ex Resistance Sets/sec Reps Notes          Long sitting hamstring stretch  30' 3 bilateral   Prone hamstring curl  2 10    Bridges  1 10 10'   Squats eccentric  1 10    Side stepping TB blue 1 10    Lateral step up  1 20    Fwd lunges on step  1 12 bilaterally   SLR  2 10 5' eccentric    Hip abduction SL   1 10 bilaterally                   Therapeutic Activities                                                               Manual Intervention       Knee mobs/PROM       Tib/Fem Mobs       Patella Mobs       Ankle mobs                     NMR re-education                                                                          Therapeutic Exercise and NMR EXR  [x] (24588) Provided verbal/tactile cueing for activities related to strengthening, flexibility, endurance, ROM for improvements in LE, proximal hip, and core control with self care, mobility, lifting, ambulation.  [] (68399) Provided verbal/tactile cueing for activities related to improving balance, coordination, kinesthetic sense, posture, motor skill, proprioception  to assist with LE, proximal hip, and core control in self care, mobility, lifting, ambulation and eccentric single leg control.      NMR and Therapeutic Activities:    [] (47441 or 10760) Provided verbal/tactile cueing for activities related to improving balance, coordination, kinesthetic sense, posture, motor skill, proprioception and motor activation to allow for proper function of core, proximal hip and LE with self care and ADLs  [] (40190) Gait Re-education- Provided training and instruction to the patient for proper LE, core and proximal hip recruitment and positioning and eccentric body weight control with ambulation re-education including up and down stairs     Home Exercise Program:    [x] (07870) Reviewed/Progressed HEP activities related to strengthening, flexibility, endurance, ROM of core, proximal hip and LE for functional self-care, mobility, lifting and ambulation/stair navigation   [] (23989)Reviewed/Progressed HEP activities related to improving balance, coordination, kinesthetic sense, posture, motor skill, proprioception of core, proximal hip and LE for self care, mobility, lifting, and ambulation/stair navigation      Manual Treatments:  PROM / STM / Oscillations-Mobs:  G-I, II, III, IV (PA's, Inf., Post.)  [] (45887) Provided manual therapy to mobilize LE, proximal hip and/or LS spine soft tissue/joints for the purpose of modulating pain, promoting relaxation,  increasing ROM, reducing/eliminating soft tissue swelling/inflammation/restriction, improving soft tissue extensibility and allowing for proper ROM for normal function with self care, mobility, lifting and ambulation. Modalities:      Charges:  Timed Code Treatment Minutes: 41   Total Treatment Minutes: 41       [] EVAL (LOW) 01489 (typically 20 minutes face-to-face)  [] EVAL (MOD) 93683 (typically 30 minutes face-to-face)  [] EVAL (HIGH) 07897 (typically 45 minutes face-to-face)  [] RE-EVAL     [x] EL(46512) x   3  [] IONTO (19919)  [] NMR (74359) x     [] VASO (67373)  [] Manual (60400) x     [] Other:  [] TA (64486)x     [] Mech Traction (26107)  [] ES(attended) (38544)     [] ES (un) (12617): If BWC Please Indicate Time In/Out and Total Minutes  CPT Code Time in Time out Total Min                                         GOALS:  Patient stated goal: To return to recreational activities without pain. []? Progressing: []? Met: []? Not Met: []? Adjusted     Therapist goals for Patient:   Short Term Goals: To be achieved in: 2 weeks  1. Independent in HEP and progression per patient tolerance, in order to prevent re-injury. []? Progressing: []? Met: []? Not Met: []? Adjusted  2. Patient will have a decrease in pain to facilitate improvement in movement, function, and ADLs as indicated by Functional Deficits. []? Progressing: []? Met: []? Not Met: []? Adjusted     Long Term Goals: To be achieved in: 4-6 weeks  1. Disability index score of 77 or more for the LEFS to assist with reaching prior level of function. []? Progressing: []? Met: []? Not Met: []? Adjusted  2. Patient will demonstrate increased AROM to WNL as compared to R to allow for proper joint functioning as indicated by patients Functional Deficits. []? Progressing: []? Met: []? Not Met: []? Adjusted  3. Patient will demonstrate an increase in Strength to good proximal hip strength and control, within 5lb HHD in LE to allow for proper functional mobility as indicated by patients Functional Deficits. []? Progressing: []? Met: []? Not Met: []? Adjusted  4.  Patient will return to squatting, ambulating, ascending/decsending stairs, kneeling, and all functional activities without increased symptoms or restriction. []? Progressing: []? Met: []? Not Met: []? Adjusted  5. To return to recreational activities without pain.(patient specific functional goal)    []? Progressing: []? Met: []? Not Met: []? Adjusted       Progression Towards Functional goals:  [] Patient is progressing as expected towards functional goals listed. [] Progression is slowed due to complexities listed. [] Progression has been slowed due to co-morbidities. [x] Plan just implemented, too soon to assess goals progression  [] Other:     ASSESSMENT:  Patient is doing very well overall without pain with all functional and recreational activities. Patient presents with mild continued swelling. Patient encouraged to continue using ice and elevating the L LE. Patient tolerated all exercises well without increase in pain. HEP updated to reflect patients progress. Potential discharge discussed with patient next visit due to progress. Return to Play: (if applicable)   []  Stage 1: Intro to Strength   []  Stage 2: Return to Run and Strength   []  Stage 3: Return to Jump and Strength   []  Stage 4: Dynamic Strength and Agility   []  Stage 5: Sport Specific Training     []  Ready to Return to Play, Meets All Above Stages   []  Not Ready for Return to Sports   Comments:            Treatment/Activity Tolerance:  [x] Patient tolerated treatment well [] Patient limited by fatique  [] Patient limited by pain  [] Patient limited by other medical complications  [] Other:     Overall Progression Towards Functional goals/ Treatment Progress Update:  [] Patient is progressing as expected towards functional goals listed. [] Progression is slowed due to complexities/Impairments listed. [] Progression has been slowed due to co-morbidities.   [x] Plan just implemented, too soon to assess goals progression <30days   [] Goals require adjustment due to lack of progress  [] Patient is not progressing as expected and requires additional follow up with physician  [] Other    Prognosis for POC: [x] Good [] Fair  [] Poor    Patient requires continued skilled intervention: [x] Yes  [] No        PLAN: See eval  [] Continue per plan of care [] Alter current plan (see comments)  [x] Plan of care initiated [] Hold pending MD visit [] Discharge    Electronically signed by: Tesfaye Atkins PT    Note: If patient does not return for scheduled/recommended follow up visits, this note will serve as a discharge from care along with the most recent update on progress.

## 2021-04-22 ENCOUNTER — APPOINTMENT (OUTPATIENT)
Dept: PHYSICAL THERAPY | Age: 76
End: 2021-04-22
Payer: MEDICARE

## 2021-04-27 ENCOUNTER — HOSPITAL ENCOUNTER (OUTPATIENT)
Dept: PHYSICAL THERAPY | Age: 76
Setting detail: THERAPIES SERIES
Discharge: HOME | End: 2021-04-27
Payer: MEDICARE

## 2021-04-27 PROCEDURE — 97530 THERAPEUTIC ACTIVITIES: CPT

## 2021-04-27 NOTE — FLOWSHEET NOTE
of ice and elevation for mild swelling. Discussed POC and overall progress. Manual Intervention       Knee mobs/PROM       Tib/Fem Mobs       Patella Mobs       Ankle mobs                     NMR re-education                                                                          Therapeutic Exercise and NMR EXR  [] (31947) Provided verbal/tactile cueing for activities related to strengthening, flexibility, endurance, ROM for improvements in LE, proximal hip, and core control with self care, mobility, lifting, ambulation.  [] (36618) Provided verbal/tactile cueing for activities related to improving balance, coordination, kinesthetic sense, posture, motor skill, proprioception  to assist with LE, proximal hip, and core control in self care, mobility, lifting, ambulation and eccentric single leg control.      NMR and Therapeutic Activities:    [x] (99240 or 98211) Provided verbal/tactile cueing for activities related to improving balance, coordination, kinesthetic sense, posture, motor skill, proprioception and motor activation to allow for proper function of core, proximal hip and LE with self care and ADLs  [] (75981) Gait Re-education- Provided training and instruction to the patient for proper LE, core and proximal hip recruitment and positioning and eccentric body weight control with ambulation re-education including up and down stairs     Home Exercise Program:    [x] (21346) Reviewed/Progressed HEP activities related to strengthening, flexibility, endurance, ROM of core, proximal hip and LE for functional self-care, mobility, lifting and ambulation/stair navigation   [] (38883)Reviewed/Progressed HEP activities related to improving balance, coordination, kinesthetic sense, posture, motor skill, proprioception of core, proximal hip and LE for self care, mobility, lifting, and ambulation/stair navigation      Manual Treatments:  PROM / STM / Oscillations-Mobs: G-I, II, III, IV (PA's, Inf., Post.)  [] (44389) Provided manual therapy to mobilize LE, proximal hip and/or LS spine soft tissue/joints for the purpose of modulating pain, promoting relaxation,  increasing ROM, reducing/eliminating soft tissue swelling/inflammation/restriction, improving soft tissue extensibility and allowing for proper ROM for normal function with self care, mobility, lifting and ambulation. Modalities:      Charges:  Timed Code Treatment Minutes: 15   Total Treatment Minutes: 15       [] EVAL (LOW) 06626 (typically 20 minutes face-to-face)  [] EVAL (MOD) 62128 (typically 30 minutes face-to-face)  [] EVAL (HIGH) 03709 (typically 45 minutes face-to-face)  [] RE-EVAL     [] UU(89395) x    [] IONTO (10216)  [] NMR (92527) x     [] VASO (59283)  [] Manual (06480) x     [] Other:  [x] TA (17454)x    1 [] Mech Traction (11731)  [] ES(attended) (38067)     [] ES (un) (00833): If BWC Please Indicate Time In/Out and Total Minutes  CPT Code Time in Time out Total Min                                         GOALS:  Patient stated goal: To return to recreational activities without pain. []? Progressing: []? Met: []? Not Met: []? Adjusted     Therapist goals for Patient:   Short Term Goals: To be achieved in: 2 weeks  1. Independent in HEP and progression per patient tolerance, in order to prevent re-injury. [x]? Progressing: []? Met: []? Not Met: []? Adjusted  2. Patient will have a decrease in pain to facilitate improvement in movement, function, and ADLs as indicated by Functional Deficits. [x]? Progressing: []? Met: []? Not Met: []? Adjusted     Long Term Goals: To be achieved in: 4-6 weeks  1. Disability index score of 77 or more for the LEFS to assist with reaching prior level of function. [x]? Progressing: []? Met: []? Not Met: []? Adjusted  2. Patient will demonstrate increased AROM to WNL as compared to R to allow for proper joint functioning as indicated by patients Functional Deficits. []? Progressing: [x]? Met: []? Not Met: []? Adjusted  3. Patient will demonstrate an increase in Strength to good proximal hip strength and control, within 5lb HHD in LE to allow for proper functional mobility as indicated by patients Functional Deficits. []? Progressing: [x]? Met: []? Not Met: []? Adjusted  4. Patient will return to squatting, ambulating, ascending/decsending stairs, kneeling, and all functional activities without increased symptoms or restriction. []? Progressing: [x]? Met: []? Not Met: []? Adjusted  5. To return to recreational activities without pain.(patient specific functional goal)    []? Progressing: [x]? Met: []? Not Met: []? Adjusted       Progression Towards Functional goals:  [] Patient is progressing as expected towards functional goals listed. [] Progression is slowed due to complexities listed. [] Progression has been slowed due to co-morbidities. [] Plan just implemented, too soon to assess goals progression  [x] Other:  Patient has met 6/7 goals. ASSESSMENT:  Patient is doing very well overall without pain with all functional and recreational activities. Patient presents with mild continued swelling. Patient encouraged to continue using ice and elevating the L LE. Patient believes at this time he is ready to discharge with HEP. PT agrees with patient based on progress and return to all activities pain free. PT reviewed HEP with patient and discussed importance to continue performing HEP to maintain improvement.      Return to Play: (if applicable)   []  Stage 1: Intro to Strength   []  Stage 2: Return to Run and Strength   []  Stage 3: Return to Jump and Strength   []  Stage 4: Dynamic Strength and Agility   []  Stage 5: Sport Specific Training     []  Ready to Return to Play, Meets All Above Stages   []  Not Ready for Return to Sports   Comments:            Treatment/Activity Tolerance:  [x] Patient tolerated treatment well [] Patient limited by loyd  [] Patient limited by pain  [] Patient limited by other medical complications  [] Other:     Overall Progression Towards Functional goals/ Treatment Progress Update:  [] Patient is progressing as expected towards functional goals listed. [] Progression is slowed due to complexities/Impairments listed. [] Progression has been slowed due to co-morbidities. [] Plan just implemented, too soon to assess goals progression <30days   [] Goals require adjustment due to lack of progress  [] Patient is not progressing as expected and requires additional follow up with physician  [x] Other: Patient has met 6/7 goals. Prognosis for POC: [x] Good [] Fair  [] Poor    Patient requires continued skilled intervention: [] Yes  [x] No        PLAN:   [] Continue per plan of care [] Alter current plan (see comments)  [] Plan of care initiated [] Hold pending MD visit [x] Discharge    Electronically signed by: Jermain Soto PT    Note: If patient does not return for scheduled/recommended follow up visits, this note will serve as a discharge from care along with the most recent update on progress.

## 2021-07-26 ENCOUNTER — OFFICE VISIT (OUTPATIENT)
Dept: ORTHOPEDIC SURGERY | Age: 76
End: 2021-07-26
Payer: MEDICARE

## 2021-07-26 VITALS — HEIGHT: 73 IN | WEIGHT: 220 LBS | BODY MASS INDEX: 29.16 KG/M2

## 2021-07-26 DIAGNOSIS — M17.12 PRIMARY OSTEOARTHRITIS OF LEFT KNEE: Primary | ICD-10-CM

## 2021-07-26 PROCEDURE — 99213 OFFICE O/P EST LOW 20 MIN: CPT | Performed by: ORTHOPAEDIC SURGERY

## 2021-07-26 PROCEDURE — 20610 DRAIN/INJ JOINT/BURSA W/O US: CPT | Performed by: ORTHOPAEDIC SURGERY

## 2021-07-26 RX ORDER — ROPIVACAINE HYDROCHLORIDE 5 MG/ML
30 INJECTION, SOLUTION EPIDURAL; INFILTRATION; PERINEURAL ONCE
Status: COMPLETED | OUTPATIENT
Start: 2021-07-26 | End: 2021-07-26

## 2021-07-26 RX ORDER — METHYLPREDNISOLONE ACETATE 40 MG/ML
40 INJECTION, SUSPENSION INTRA-ARTICULAR; INTRALESIONAL; INTRAMUSCULAR; SOFT TISSUE ONCE
Status: COMPLETED | OUTPATIENT
Start: 2021-07-26 | End: 2021-07-26

## 2021-07-26 RX ADMIN — ROPIVACAINE HYDROCHLORIDE 30 ML: 5 INJECTION, SOLUTION EPIDURAL; INFILTRATION; PERINEURAL at 11:06

## 2021-07-26 RX ADMIN — METHYLPREDNISOLONE ACETATE 40 MG: 40 INJECTION, SUSPENSION INTRA-ARTICULAR; INTRALESIONAL; INTRAMUSCULAR; SOFT TISSUE at 11:06

## 2021-07-26 NOTE — PROGRESS NOTES
Administrations This Visit     methylPREDNISolone acetate (DEPO-MEDROL) injection 40 mg     Admin Date  07/26/2021  11:06 Action  Given Dose  40 mg Route  Intramuscular Site  Knee Left Administered By  Keara     Ordering Provider: Rosa England MD    NDC: 6023-4950-64    Lot#: [id number]    : [id number]  [facility].     Patient Supplied?: No          ropivacaine (NAROPIN) 0.5% injection 30 mL     Admin Date  07/26/2021  11:06 Action  Given Dose  30 mL Route  Epidural Site  Knee Left Administered By  Keara     Ordering Provider: Rosa England MD    NDC: 32823-693-60    Lot#: [id number]    : [address] [facility]    Patient Supplied?: No
Stress:       Feeling of Stress :   Social Connections:       Frequency of Communication with Friends and Family:       Frequency of Social Gatherings with Friends and Family:       Attends Christianity Services:       Active Member of Clubs or Organizations:       Attends Club or Organization Meetings:       Marital Status:   Intimate Partner Violence:       Fear of Current or Ex-Partner:       Emotionally Abused:       Physically Abused:       Sexually Abused:     Current Outpatient Medications:  diclofenac (VOLTAREN) 50 MG EC tablet, Take 1 tablet by mouth 2 times daily (with meals), Disp: 60 tablet, Rfl: 3  simvastatin (ZOCOR) 10 MG tablet, Take 10 mg by mouth nightly, Disp: , Rfl:    aspirin 81 MG tablet, Take 81 mg by mouth daily. , Disp: , Rfl:     No current facility-administered medications for this visit. No Known Allergies    VITAL SIGNS:  Ht 6' 1\" (1.854 m)   Wt 220 lb (99.8 kg)   BMI 29.03 kg/m²   On examination today he has a small effusion but no warmth or erythema. He feels tight in full flexion but can achieve above 120 degrees of flexion. He is slightly tender to medial joint line Edis's testing does not increase pain or produce clunk or shift. He has negative Lachman's and pivot shift. Is no lateral joint line or retinacular tenderness. Previous x-rays show he has little tilt of his patella but fairly well-maintained joint spaces. Impression osteoarthritis of left knee. We did discuss the patient could have been a new meniscus tear but he does not have mechanical symptoms. We suggested is an injection which she accepted. After sterile prep injected left knee with 80 mg of Depo-Medrol and 15 mg ropivacaine. Patient tolerated this procedure well. Told him if he was not getting significant relief next week he should call we can arrange for an MRI to look from meniscal tear.

## 2021-08-09 ENCOUNTER — HOSPITAL ENCOUNTER (OUTPATIENT)
Dept: MRI IMAGING | Age: 76
Discharge: HOME | End: 2021-08-09
Payer: MEDICARE

## 2021-08-09 DIAGNOSIS — M17.12 PRIMARY OSTEOARTHRITIS OF LEFT KNEE: ICD-10-CM

## 2021-08-09 PROCEDURE — 73721 MRI JNT OF LWR EXTRE W/O DYE: CPT

## 2021-08-11 ENCOUNTER — OFFICE VISIT (OUTPATIENT)
Dept: ORTHOPEDIC SURGERY | Age: 76
End: 2021-08-11
Payer: MEDICARE

## 2021-08-11 VITALS — HEIGHT: 73 IN | WEIGHT: 220 LBS | BODY MASS INDEX: 29.16 KG/M2

## 2021-08-11 DIAGNOSIS — S83.232A COMPLEX TEAR OF MEDIAL MENISCUS OF LEFT KNEE AS CURRENT INJURY, INITIAL ENCOUNTER: Primary | ICD-10-CM

## 2021-08-11 PROCEDURE — 99213 OFFICE O/P EST LOW 20 MIN: CPT | Performed by: ORTHOPAEDIC SURGERY

## 2021-08-11 NOTE — PROGRESS NOTES
Patient returns today for follow-up of his left knee. 3 and half years ago he had a scope with a lateral meniscectomy. Was noted he had 3 a lesion of the medial femoral condyle. He has done well until recently when he still getting some sharp pains in the medial side of this knee. We thought he might have a meniscal tear and sent him for an MRI which returns with today. He does note however in the last few days he is really had no pain with his knee. ROS: Pertinent items are noted in HPI. No notes on file    Past Medical History:  No date: Hyperlipidemia  No date: ARAMIS (obstructive sleep apnea)     Past Surgical History:  No date: HERNIA REPAIR  No date: KNEE ARTHROSCOPY; Left  2018: OTHER SURGICAL HISTORY; Left      Comment:  LEFT KNEE ARTHROSCOPE,PLICA EXCISION;SYNOVECTOMY PARTIAL  No date: SHOULDER SURGERY;  Right      Comment:  NERVE RELEASE -     Review of patient's family history indicates:  Problem: High Blood Pressure      Relation: Mother          Age of Onset: (Not Specified)  Problem: Stroke      Relation: Neg Hx          Age of Onset: (Not Specified)      Social History    Socioeconomic History      Marital status:       Spouse name: None      Number of children: None      Years of education: None      Highest education level: None    Occupational History      None    Tobacco Use      Smoking status: Former Smoker        Quit date: 1986        Years since quittin.6      Smokeless tobacco: Never Used    Substance and Sexual Activity      Alcohol use: Yes        Comment: Occasional       Drug use: None      Sexual activity: None    Other Topics      Concerns:        None    Social History Narrative      None    Social Determinants of Health  Financial Resource Strain:       Difficulty of Paying Living Expenses:   Food Insecurity:       Worried About Running Out of Food in the Last Year:       Ran Out of Food in the Last Year:   Transportation Needs:       Lack of Transportation (Medical):       Lack of Transportation (Non-Medical):   Physical Activity:       Days of Exercise per Week:       Minutes of Exercise per Session:   Stress:       Feeling of Stress :   Social Connections:       Frequency of Communication with Friends and Family:       Frequency of Social Gatherings with Friends and Family:       Attends Bahai Services:       Active Member of Clubs or Organizations:       Attends Club or Organization Meetings:       Marital Status:   Intimate Partner Violence:       Fear of Current or Ex-Partner:       Emotionally Abused:       Physically Abused:       Sexually Abused:     Current Outpatient Medications:  diclofenac (VOLTAREN) 50 MG EC tablet, Take 1 tablet by mouth 2 times daily (with meals), Disp: 60 tablet, Rfl: 3  simvastatin (ZOCOR) 10 MG tablet, Take 10 mg by mouth nightly, Disp: , Rfl:    aspirin 81 MG tablet, Take 81 mg by mouth daily. , Disp: , Rfl:     No current facility-administered medications for this visit. No Known Allergies    VITAL SIGNS:  Ht 6' 1\" (1.854 m)   Wt 220 lb (99.8 kg)   BMI 29.03 kg/m²   On examination today he has no warmth, erythema, or effusion of the left knee. He really has minimal if any tenderness in the medial joint line Edis's testing does not produce a clunk or shift. He has no retinacular or lateral joint line tenderness. He has a stable ligamentous exam.    His MRI shows a large complex medial meniscus tear. Impression some osteoarthritis with the large medial meniscus tear. Plan: We discussed multiple different options with this patient. I told him probably the single operation will provide him with the most long-lasting and if no relief would be total knee arthroplasty. Considering S3 lesion he is very high down to bone lateral half of the medial femoral condyle. ~Scope could be done in the meniscectomy but it would be uncertain how much pain relief he would get in for how long.   Since patient is not symptomatic at this point he is just can live with it and return if he has increasing pain. Total time with patient was approximately 20 minutes.

## 2021-08-23 ENCOUNTER — OFFICE VISIT (OUTPATIENT)
Dept: ORTHOPEDIC SURGERY | Age: 76
End: 2021-08-23
Payer: MEDICARE

## 2021-08-23 VITALS — BODY MASS INDEX: 29.16 KG/M2 | HEIGHT: 73 IN | WEIGHT: 220 LBS

## 2021-08-23 DIAGNOSIS — S83.232A COMPLEX TEAR OF MEDIAL MENISCUS OF LEFT KNEE AS CURRENT INJURY, INITIAL ENCOUNTER: Primary | ICD-10-CM

## 2021-08-23 DIAGNOSIS — M17.12 PRIMARY OSTEOARTHRITIS OF LEFT KNEE: ICD-10-CM

## 2021-08-23 PROCEDURE — 99213 OFFICE O/P EST LOW 20 MIN: CPT | Performed by: ORTHOPAEDIC SURGERY

## 2021-08-23 NOTE — PROGRESS NOTES
The patient returns today for basically consultation on his left knee. 3 and half years ago he had a scope and partial medial meniscectomy and was doing well until a few weeks ago when he started having more pain and some swelling in the left knee. We felt he had probably a recurrent medial meniscus tear and indeed an MRI confirmed this. He comes back today just to consult on what he thinks he wants to do with this in the future. He has a fishing trip planned and would like to get through this before anything definitive is done. ROS: Pertinent items are noted in HPI. No notes on file    Past Medical History:  No date: Hyperlipidemia  No date: ARAMIS (obstructive sleep apnea)     Past Surgical History:  No date: HERNIA REPAIR  No date: KNEE ARTHROSCOPY; Left  2018: OTHER SURGICAL HISTORY; Left      Comment:  LEFT KNEE ARTHROSCOPE,PLICA EXCISION;SYNOVECTOMY PARTIAL  No date: SHOULDER SURGERY;  Right      Comment:  NERVE RELEASE -     Review of patient's family history indicates:  Problem: High Blood Pressure      Relation: Mother          Age of Onset: (Not Specified)  Problem: Stroke      Relation: Neg Hx          Age of Onset: (Not Specified)      Social History    Socioeconomic History      Marital status:       Spouse name: None      Number of children: None      Years of education: None      Highest education level: None    Occupational History      None    Tobacco Use      Smoking status: Former Smoker        Quit date: 1986        Years since quittin.6      Smokeless tobacco: Never Used    Substance and Sexual Activity      Alcohol use: Yes        Comment: Occasional       Drug use: None      Sexual activity: None    Other Topics      Concerns:        None    Social History Narrative      None    Social Determinants of Health  Financial Resource Strain:       Difficulty of Paying Living Expenses:   Food Insecurity:       Worried About Running Out of Food in the Last Year:       Ran Out of Food in the Last Year:   Transportation Needs:       Lack of Transportation (Medical):       Lack of Transportation (Non-Medical):   Physical Activity:       Days of Exercise per Week:       Minutes of Exercise per Session:   Stress:       Feeling of Stress :   Social Connections:       Frequency of Communication with Friends and Family:       Frequency of Social Gatherings with Friends and Family:       Attends Evangelical Services:       Active Member of Clubs or Organizations:       Attends Club or Organization Meetings:       Marital Status:   Intimate Partner Violence:       Fear of Current or Ex-Partner:       Emotionally Abused:       Physically Abused:       Sexually Abused:     Current Outpatient Medications:  diclofenac (VOLTAREN) 50 MG EC tablet, Take 1 tablet by mouth 2 times daily (with meals), Disp: 60 tablet, Rfl: 3  simvastatin (ZOCOR) 10 MG tablet, Take 10 mg by mouth nightly, Disp: , Rfl:    aspirin 81 MG tablet, Take 81 mg by mouth daily. , Disp: , Rfl:     No current facility-administered medications for this visit. No Known Allergies    VITAL SIGNS:  Ht 6' 1\" (1.854 m)   Wt 220 lb (99.8 kg)   BMI 29.03 kg/m²   We discussed total knee arthroplasty versus meniscus excision. He knows he has mild to moderate arthritis in all 3 compartments. I told him I could not predict that what percent of his pain is coming from a meniscus tear already or from the arthritis or the combination of the 2. Told he might get a few more years of his knee which is a scope and meniscectomy. I told her if he wants 1 operation that would probably last his lifetime would be total knee arthroplasty. We discussed the much more lengthy rehab after total knee arthroplasty but the more complete pain relief as well. We answered all questions. He was satisfied with this. He said he might proceed with umbilical scope in October.   Number that he will be done operating and we can recommend someone for which ever procedure he would like to proceed. We will see him back as needed. Total time spent with patient going over old x-rays and MRI discussing different treatment options and the potential outcomes etc. took about 20 minutes.

## 2021-09-28 ENCOUNTER — TELEPHONE (OUTPATIENT)
Dept: ORTHOPEDIC SURGERY | Age: 76
End: 2021-09-28

## 2021-09-28 NOTE — TELEPHONE ENCOUNTER
FAXED JUANA / [facility] [address] ( Brian Cutting ) ANY AND ALL TO   [facility] @ [address] @ [phone]

## 2022-10-11 NOTE — ADDENDUM NOTE
Addended by: Shilpa Martinez on: 8/2/2021 08:40 AM     Modules accepted: Orders Protopic Counseling: Patient may experience a mild burning sensation during topical application. Protopic is not approved in children less than 2 years of age. There have been case reports of hematologic and skin malignancies in patients using topical calcineurin inhibitors although causality is questionable.

## 2023-02-08 ENCOUNTER — HOSPITAL ENCOUNTER (OUTPATIENT)
Dept: PHYSICAL THERAPY | Age: 78
Setting detail: THERAPIES SERIES
Discharge: HOME | End: 2023-02-08
Payer: MEDICARE

## 2023-02-08 DIAGNOSIS — R29.898 DECREASED STRENGTH OF LOWER EXTREMITY: ICD-10-CM

## 2023-02-08 DIAGNOSIS — M25.552 HIP PAIN, LEFT: Primary | ICD-10-CM

## 2023-02-08 DIAGNOSIS — M25.652 DECREASED RANGE OF LEFT HIP MOVEMENT: ICD-10-CM

## 2023-02-08 PROCEDURE — 97112 NEUROMUSCULAR REEDUCATION: CPT

## 2023-02-08 PROCEDURE — 97161 PT EVAL LOW COMPLEX 20 MIN: CPT

## 2023-02-08 NOTE — FLOWSHEET NOTE
[place] [address] [facility]  Phone: [phone] Fax: [phone]    Physical Therapy Treatment Note/ Progress Report:       Date:  2023    Patient Name:  Swati Trujillo    :    MRN: [id number]  Restrictions/Precautions:    Medical/Treatment Diagnosis Information:  Diagnosis: O60.691- S. P left hip replacement       ICD-10-CM    1. Hip pain, left  M25.552       2. Decreased strength of lower extremity  R29.898       3.  Decreased range of left hip movement  M25.652           Insurance/Certification information:  PT Insurance Information: Medicare/ AARP  Physician Information:  Dheeraj Deluna  Plan of care signed (Y/N):     Date of Patient follow up with Physician:      Progress Report: []  Yes  []  No     Date Range for reporting period:  Beginnin2023  Ending:     Progress report due (10 Rx/or 30 days whichever is less):      Recertification due (POC duration/ or 90 days whichever is less): 2023     Visit # Insurance Allowable Auth Needed   1 MN []Yes    [x]No     Latex Allergy:  [x]NO      []YES  Preferred Language for Healthcare:   [x]English       []other:  Functional Scale: LEFS 30/80, 62.5% dysfunction  Date assessed:2023    Pain level:  4/10     SUBJECTIVE:  See eval    OBJECTIVE: See eval  Observation:   Test measurements:      RESTRICTIONS/PRECAUTIONS: Left CATRINA 2023    Exercises/Interventions:     Therapeutic Ex (56352)   Min: Sets/sec Reps Notes/CUES   Retro Stepper/BIKE   NV         Alter G      BFR      Sportcord March      3 way SLR      SAQ      Clam ABD      Hip Ext Yoshi Jeremiah      BOSU fwd/side lunge      BOSU squat      Leg Press Iso/Con/Ecc 0-      Cybex HS curl      TKE      Glute side walks      RDL      Slide Lunge      Slide HS eccentrics      Step ups/ecc step down      Swissball wall rolls- in SLS- hip drive      Quad hip ext/wall-ball rolls      SAQ 1 10    Mini squats SB 1 10    Manual Intervention (91094)  Min:      Knee mobs/PROM      Tib/Fem Mobs      Patella Mobs      Ankle mobs      Prone quad stretch   NV         NMR re-education (21008)  Min:   CUES NEEDED   [place]/Biofeedback 10/10      BFR      G. Med activation      Hip Ext full ROM/ G. Activation      Bosu Bal and Prop- G Med      Single leg stance/Balance/Prop      Bosu Retro G. Med act      Prone Hip froggers- sliders/elevated      Hip Add iso c bridge 1 20    Hip Add Isometric 10\" 10    Hip Abd iso c bridge 1 20    Hip Abd Isometric 10\" 10    Therapeutic Activity (98579)  Min:      Ladders      Plyos      Dynamic Balance                            Therapeutic Exercise and NMR EXR  [x] (10763) Provided verbal/tactile cueing for activities related to strengthening, flexibility, endurance, ROM for improvements in LE, proximal hip, and core control with self care, mobility, lifting, ambulation. [x] (23731) Provided verbal/tactile cueing for activities related to improving balance, coordination, kinesthetic sense, posture, motor skill, proprioception  to assist with LE, proximal hip, and core control in self care, mobility, lifting, ambulation and eccentric single leg control.      NMR and Therapeutic Activities:    [x] (25513 or 78635) Provided verbal/tactile cueing for activities related to improving balance, coordination, kinesthetic sense, posture, motor skill, proprioception and motor activation to allow for proper function of core, proximal hip and LE with self care and ADLs and functional mobility.   [] (64444) Gait Re-education- Provided training and instruction to the patient for proper LE, core and proximal hip recruitment and positioning and eccentric body weight control with ambulation re-education including up and down stairs     Home Exercise Program:    [x] (45934) Reviewed/Progressed HEP activities related to strengthening, flexibility, endurance, ROM of core, proximal hip and LE for functional self-care, mobility, lifting and ambulation/stair navigation   [] (78602)Reviewed/Progressed HEP activities related to improving balance, coordination, kinesthetic sense, posture, motor skill, proprioception of core, proximal hip and LE for self care, mobility, lifting, and ambulation/stair navigation      Manual Treatments:  PROM / STM / Oscillations-Mobs:  G-I, II, III, IV (PA's, Inf., Post.)  [] (41113) Provided manual therapy to mobilize LE, proximal hip and/or LS spine soft tissue/joints for the purpose of modulating pain, promoting relaxation,  increasing ROM, reducing/eliminating soft tissue swelling/inflammation/restriction, improving soft tissue extensibility and allowing for proper ROM for normal function with self care, mobility, lifting and ambulation. Modalities:     [] GAME READY (VASO)- for significant edema, swelling, pain control. Charges:  Timed Code Treatment Minutes: 15   Total Treatment Minutes: 45      [x] EVAL (LOW) 32230 (typically 20 minutes face-to-face)  [] EVAL (MOD) 22002 (typically 30 minutes face-to-face)  [] EVAL (HIGH) 36117 (typically 45 minutes face-to-face)  [] RE-EVAL     [] OZ(51988) x     [] DRY NEEDLE 1 OR 2 MUSCLES  [x] NMR (17875) x   1  [] DRY NEEDLE 3+ MUSCLES  [] Manual (69839) x       [] TA (91777) x     [] Mech Traction (75270)  [] ES(attended) (95194)     [] ES (un) (41279):   [] VASO (38386)  [] Other:    If [facility] Please Indicate Time In/Out  CPT Code Time in Time out                                   Access Code: [email or web address]  URL: [email or web address]  Date: 02/08/2023  Prepared by: Rashmi Chaves    Exercises  Hooklying Isometric Clamshell - 1-3 x daily - 7 x weekly - 1 sets - 10 reps - 10 seconds hold  Supine Hip Adduction Isometric with Ball - 1-3 x daily - 7 x weekly - 1 sets - 10 reps - 10 seconds hold  Supine Bridge with Mini Swiss Ball Between Knees - 1-3 x daily - 7 x weekly - 3 sets - 10 reps  Bridge with Hip Abduction and Resistance - 1-3 x daily - 7 x weekly - 3 sets - 10 reps  (see scanned forms)    GOALS:  Patient stated goal: regular activity  [] Progressing: [] Met: [] Not Met: [] Adjusted    Therapist goals for Patient:   Short Term Goals: To be achieved in: 2 weeks  1. Independent in HEP and progression per patient tolerance, in order to prevent re-injury. [] Progressing: [] Met: [] Not Met: [] Adjusted  2. Patient will have a decrease in pain to facilitate improvement in movement, function, and ADLs as indicated by Functional Deficits. [] Progressing: [] Met: [] Not Met: [] Adjusted    Long Term Goals: To be achieved in: 8 weeks  1. Disability index score of 62.5% or less for the LEFS to assist with reaching prior level of function. [] Progressing: [] Met: [] Not Met: [] Adjusted  2. Patient will demonstrate increased AROM to Hip flexion 90 degrees, knee flexion 130 degrees to allow for proper joint functioning as indicated by patients Functional Deficits. [] Progressing: [] Met: [] Not Met: [] Adjusted  3. Patient will demonstrate an increase in hip flexion, abduction, quad, hamstring  5/5 Strength to good proximal hip strength and control, within 5lb HHD in LE to allow for proper functional mobility as indicated by patients Functional Deficits. [] Progressing: [] Met: [] Not Met: [] Adjusted  4. Patient will return to Sit to stand 2x10 functional activities without increased symptoms or restriction. [] Progressing: [] Met: [] Not Met: [] Adjusted  5.  Patient will return to playing pickleball (half game) without increased symptoms or restriction (patient specific functional goal)    [] Progressing: [] Met: [] Not Met: [] Adjusted     ASSESSMENT:  See eval    Return to Play: (if applicable)   []  Stage 1: Intro to Strength   []  Stage 2: Return to Run and Strength   []  Stage 3: Return to Jump and Strength   []  Stage 4: Dynamic Strength and Agility   []  Stage 5: Sport Specific Training     []  Ready to Return to Play, Autopilot All Above Stages   []  Not Ready for Return to Sports   Comments:            Treatment/Activity Tolerance:  [x] Patient tolerated treatment well [] Patient limited by fatique  [] Patient limited by pain  [] Patient limited by other medical complications  [] Other:     Overall Progression Towards Functional goals/ Treatment Progress Update:  [] Patient is progressing as expected towards functional goals listed. [] Progression is slowed due to complexities/Impairments listed. [] Progression has been slowed due to co-morbidities. [x] Plan just implemented, too soon to assess goals progression <30days   [] Goals require adjustment due to lack of progress  [] Patient is not progressing as expected and requires additional follow up with physician  [] Other    Prognosis for POC: [x] Good [] Fair  [] Poor    Patient requires continued skilled intervention: [x] Yes  [] No        PLAN: See eval  [] Continue per plan of care [] Alter current plan (see comments)  [x] Plan of care initiated [] Hold pending MD visit [] Discharge    Electronically signed by: Isaac Nogueira PT DPT, MS    Note: If patient does not return for scheduled/recommended follow up visits, this note will serve as a discharge from care along with the most recent update on progress.

## 2023-02-10 ENCOUNTER — HOSPITAL ENCOUNTER (OUTPATIENT)
Dept: PHYSICAL THERAPY | Age: 78
Setting detail: THERAPIES SERIES
Discharge: HOME | End: 2023-02-10
Payer: MEDICARE

## 2023-02-10 PROCEDURE — 97016 VASOPNEUMATIC DEVICE THERAPY: CPT

## 2023-02-10 PROCEDURE — 97112 NEUROMUSCULAR REEDUCATION: CPT

## 2023-02-10 PROCEDURE — 97110 THERAPEUTIC EXERCISES: CPT

## 2023-02-10 NOTE — FLOWSHEET NOTE
[facility] [address] [place] [address]  Phone: [phone] Fax: [phone]    Physical Therapy Treatment Note/ Progress Report:       Date:  02/10/2023    Patient Name:  Suman Terry    :  1945  MRN: [id number]  Restrictions/Precautions:    Medical/Treatment Diagnosis Information:  Diagnosis: Z96.642- S.P left hip replacement       ICD-10-CM    1. Hip pain, left  M25.552       2. Decreased strength of lower extremity  R29.898       3. Decreased range of left hip movement  M25.652           Insurance/Certification information:  PT Insurance Information: Medicare/ AARP  Physician Information:  Silvino DelA ngel*  Plan of care signed (Y/N):     Date of Patient follow up with Physician:      Progress Report: []  Yes  []  No     Date Range for reporting period:  Beginnin2023  Ending:     Progress report due (10 Rx/or 30 days whichever is less): 2023     Recertification due (POC duration/ or 90 days whichever is less): 2023     Visit # Insurance Allowable Auth Needed   2 MN []Yes    [x]No     Latex Allergy:  [x]NO      []YES  Preferred Language for Healthcare:   [x]English       []other:  Functional Scale: LEFS 30/80, 62.5% dysfunction  Date assessed:2023    Pain level:  4/10     SUBJECTIVE:  Maycol stated he tolerated last session well.     OBJECTIVE: See eval  Observation:   Test measurements:      RESTRICTIONS/PRECAUTIONS: Left CATRINA 2023    Exercises/Interventions:     Therapeutic Ex (30799)   Min: Sets/sec Reps Notes/CUES   Retro Stepper/BIKE   NV         Alter G      BFR      Sportcord March      3 way SLR      SAQ      Clam ABD      Hip Ext /table      BOSU fwd/side lunge      BOSU squat      Leg Press Iso/Con/Ecc 0-      Cybex HS curl      TKE      Glute side walks      RDL      Slide Lunge 2 10 Posterior   Slide HS eccentrics      Step ups/ecc step down      Swissball wall rolls- in SLS- hip  drive      Quad hip ext/wall-ball rolls      TKE 5\" 20    Hip extension 2 10 Bent over table   BOSU Lunge Stomp 10\" 10    SAQ    Mini squats SB 2 10    Manual Intervention (24188)  Min:      Knee mobs/PROM      Tib/Fem Mobs      Patella Mobs      Ankle mobs      Prone quad stretch 30\" 3    Hip   ROM   NV   NMR re-education (65337)  Min:   CUES NEEDED   Citizen of [place]/Biofeedback 10/10      BFR      G. Med activation      Hip Ext full ROM/ G. Activation      Bosu Bal and Prop- G Med      Single leg stance/Balance/Prop      Bosu Retro G. Med act      Prone Hip froggers- sliders/elevated      Hip flexor isometric 10\" 10    Hip Add iso c bridge 1 20    Hip Add Isometric 10\" 5    Hip Abd iso c bridge 1 20    Hip Abd Isometric 10\" 5 Red strap   Therapeutic Activity (24600)  Min:      Ladders      Plyos      Dynamic Balance                            Therapeutic Exercise and NMR EXR  [x] (36674) Provided verbal/tactile cueing for activities related to strengthening, flexibility, endurance, ROM for improvements in LE, proximal hip, and core control with self care, mobility, lifting, ambulation. [x] (89470) Provided verbal/tactile cueing for activities related to improving balance, coordination, kinesthetic sense, posture, motor skill, proprioception  to assist with LE, proximal hip, and core control in self care, mobility, lifting, ambulation and eccentric single leg control.      NMR and Therapeutic Activities:    [x] (33122 or 53685) Provided verbal/tactile cueing for activities related to improving balance, coordination, kinesthetic sense, posture, motor skill, proprioception and motor activation to allow for proper function of core, proximal hip and LE with self care and ADLs and functional mobility.   [] (68200) Gait Re-education- Provided training and instruction to the patient for proper LE, core and proximal hip recruitment and positioning and eccentric body weight control with ambulation re-education including up and down stairs     Home Exercise Program:    [x] (71150) Reviewed/Progressed HEP activities related to strengthening, flexibility, endurance, ROM of core, proximal hip and LE for functional self-care, mobility, lifting and ambulation/stair navigation   [] (33836)Reviewed/Progressed HEP activities related to improving balance, coordination, kinesthetic sense, posture, motor skill, proprioception of core, proximal hip and LE for self care, mobility, lifting, and ambulation/stair navigation      Manual Treatments:  PROM / STM / Oscillations-Mobs:  G-I, II, III, IV (PA's, Inf., Post.)  [] (67633) Provided manual therapy to mobilize LE, proximal hip and/or LS spine soft tissue/joints for the purpose of modulating pain, promoting relaxation,  increasing ROM, reducing/eliminating soft tissue swelling/inflammation/restriction, improving soft tissue extensibility and allowing for proper ROM for normal function with self care, mobility, lifting and ambulation. Modalities:     [x] GAME READY (VASO)- for significant edema, swelling, pain control. Charges:  Timed Code Treatment Minutes: 45   Total Treatment Minutes: 60      [] EVAL (LOW) 52615 (typically 20 minutes face-to-face)  [] EVAL (MOD) 29313 (typically 30 minutes face-to-face)  [] EVAL (HIGH) 36158 (typically 45 minutes face-to-face)  [] RE-EVAL     [x] WD(97094) x   2  [] DRY NEEDLE 1 OR 2 MUSCLES  [x] NMR (90384) x   1  [] DRY NEEDLE 3+ MUSCLES  [] Manual (81467) x       [] TA (76830) x     [] Mech Traction (05979)  [] ES(attended) (07658)     [] ES (un) (59870):   [x] VASO (82174) Min  [] Other:    If [facility] Please Indicate Time In/Out  CPT Code Time in Time out                                   Access Code: [email or web address]  URL: [email or web address]  Date: 02/08/2023  Prepared by: Aaron Range    Exercises  Hooklying Isometric Clamshell - 1-3 x daily - 7 x weekly - 1 sets - 10 reps - 10 seconds hold  Supine Hip Adduction Isometric with Ball - 1-3 x daily - 7 x weekly - 1 sets - 10 reps - 10 seconds hold  Supine Bridge with Mini Swiss Ball Between Knees - 1-3 x daily - 7 x weekly - 3 sets - 10 reps  Bridge with Hip Abduction and Resistance - 1-3 x daily - 7 x weekly - 3 sets - 10 reps  (see scanned forms)    GOALS:  Patient stated goal: regular activity  [] Progressing: [] Met: [] Not Met: [] Adjusted    Therapist goals for Patient:   Short Term Goals: To be achieved in: 2 weeks  1. Independent in HEP and progression per patient tolerance, in order to prevent re-injury. [] Progressing: [] Met: [] Not Met: [] Adjusted  2. Patient will have a decrease in pain to facilitate improvement in movement, function, and ADLs as indicated by Functional Deficits. [] Progressing: [] Met: [] Not Met: [] Adjusted    Long Term Goals: To be achieved in: 8 weeks  1. Disability index score of 62.5% or less for the LEFS to assist with reaching prior level of function. [] Progressing: [] Met: [] Not Met: [] Adjusted  2. Patient will demonstrate increased AROM to Hip flexion 90 degrees, knee flexion 130 degrees to allow for proper joint functioning as indicated by patients Functional Deficits. [] Progressing: [] Met: [] Not Met: [] Adjusted  3. Patient will demonstrate an increase in hip flexion, abduction, quad, hamstring  5/5 Strength to good proximal hip strength and control, within 5lb HHD in LE to allow for proper functional mobility as indicated by patients Functional Deficits. [] Progressing: [] Met: [] Not Met: [] Adjusted  4. Patient will return to Sit to stand 2x10 functional activities without increased symptoms or restriction. [] Progressing: [] Met: [] Not Met: [] Adjusted  5. Patient will return to playing pickleball (half game) without increased symptoms or restriction (patient specific functional goal)    [] Progressing: [] Met: [] Not Met: [] Adjusted     ASSESSMENT:  Erna Bazzi presented with no increased pain this date. States that he feels like he is more confident on the [facility].  Added quad stretch in prone this date with good relief to anterior hip. Added hip flexor isometric (30-50% effort) for light hip flexor activation. TKE and BOSU stomp also added for quad and general lower extremity strengthening. Maycol tolerated all interventions well and will cont to benefit from skilled PT interventions to progress to PLOF. VASO this date for swelling. Return to Play: (if applicable)   []  Stage 1: Intro to Strength   []  Stage 2: Return to Run and Strength   []  Stage 3: Return to Jump and Strength   []  Stage 4: Dynamic Strength and Agility   []  Stage 5: Sport Specific Training     []  Ready to Return to Play, Meets All Above Stages   []  Not Ready for Return to Sports   Comments:            Treatment/Activity Tolerance:  [x] Patient tolerated treatment well [] Patient limited by fatique  [] Patient limited by pain  [] Patient limited by other medical complications  [] Other:     Overall Progression Towards Functional goals/ Treatment Progress Update:  [] Patient is progressing as expected towards functional goals listed. [] Progression is slowed due to complexities/Impairments listed. [] Progression has been slowed due to co-morbidities. [x] Plan just implemented, too soon to assess goals progression <30days   [] Goals require adjustment due to lack of progress  [] Patient is not progressing as expected and requires additional follow up with physician  [] Other    Prognosis for POC: [x] Good [] Fair  [] Poor    Patient requires continued skilled intervention: [x] Yes  [] No        PLAN: See eval  [] Continue per plan of care [] Alter current plan (see comments)  [x] Plan of care initiated [] Hold pending MD visit [] Discharge    Electronically signed by: Epifanio Miranda PT DPT, MS    Note: If patient does not return for scheduled/recommended follow up visits, this note will serve as a discharge from care along with the most recent update on progress.

## 2023-02-13 ENCOUNTER — HOSPITAL ENCOUNTER (OUTPATIENT)
Dept: PHYSICAL THERAPY | Age: 78
Setting detail: THERAPIES SERIES
Discharge: HOME | End: 2023-02-13
Payer: MEDICARE

## 2023-02-13 PROCEDURE — 97112 NEUROMUSCULAR REEDUCATION: CPT

## 2023-02-13 PROCEDURE — 97110 THERAPEUTIC EXERCISES: CPT

## 2023-02-13 NOTE — FLOWSHEET NOTE
[place] [address] [facility]  Phone: [phone] Fax: [phone]    Physical Therapy Treatment Note/ Progress Report:       Date:  2023    Patient Name:  Tyra Guy    :  [birth year]  MRN: [id number]  Restrictions/Precautions:    Medical/Treatment Diagnosis Information:  Diagnosis: I92.661- S. P left hip replacement       ICD-10-CM    1. Hip pain, left  M25.552       2. Decreased strength of lower extremity  R29.898       3. Decreased range of left hip movement  M25.652           Insurance/Certification information:  PT Insurance Information: Medicare/ AARP  Physician Information:  Bernardo Chavez*  Plan of care signed (Y/N):     Date of Patient follow up with Physician:      Progress Report: []  Yes  []  No     Date Range for reporting period:  Beginnin2023  Ending:     Progress report due (10 Rx/or 30 days whichever is less):      Recertification due (POC duration/ or 90 days whichever is less): 2023     Visit # Insurance Allowable Auth Needed   3 MN []Yes    [x]No     Latex Allergy:  [x]NO      []YES  Preferred Language for Healthcare:   [x]English       []other:  Functional Scale: LEFS 30/80, 62.5% dysfunction  Date assessed:2023    Pain level:  4/10     SUBJECTIVE:  Giselle Washburn stated he tolerated last session well. Has been compliant with HEP at this time. Questioned how many times he should be doing HEP/week.     OBJECTIVE: See eval  Observation:   Test measurements:      RESTRICTIONS/PRECAUTIONS: Left CATRINA 2023    Exercises/Interventions:     Therapeutic Ex (18762)   Min: Sets/sec Reps Notes/CUES   Retro Stepper/BIKE   NV         Alter G      BFR      Sportcord March      3 way SLR      SAQ      Clam ABD      Hip Ext Mina Kand      BOSU fwd/side lunge      BOSU squat      Leg Press Iso/Con/Ecc 0-      Cybex HS curl      TKE      Glute side walks 5 5    RDL      Slide Lunge 2 10 Posterior   Slide HS eccentrics      Step ups/ecc step down      Swissball wall rolls- in SLS- hip drive            Leg Press Full Range 2 15 70-90#   TKE 5\" 20 HELD   Hip extension 2 10 Bent over table   BOSU Lunge Stomp 10\" 10    SAQ    Mini squats SB 2 15    Manual Intervention (08788)  Min:      Knee mobs/PROM      Tib/Fem Mobs      Patella Mobs      Ankle mobs      Prone quad stretch 30\" 3    Hip   ROM  4' Within restrictions (Log Roll, abd)   NMR re-education (45711)  Min:   CUES NEEDED   Tajik/Biofeedback 10/10      BFR      G. Med activation      Hip Ext full ROM/ G. Activation      Bosu Bal and Prop- G Med      Single leg stance/Balance/Prop      Bosu Retro G. Med act      Prone Hip froggers- sliders/elevated      Hip flexor isometric 10\" 10    Hip Add iso c bridge 1 20    Hip Add Isometric    Hip Abd iso c bridge 1 20    Hip Abd Isometric 10\" 5 Red strap   Therapeutic Activity (94527)  Min:      Ladders      Plyos      Dynamic Balance                            Therapeutic Exercise and NMR EXR  [x] (32725) Provided verbal/tactile cueing for activities related to strengthening, flexibility, endurance, ROM for improvements in LE, proximal hip, and core control with self care, mobility, lifting, ambulation.  [x] (85311) Provided verbal/tactile cueing for activities related to improving balance, coordination, kinesthetic sense, posture, motor skill, proprioception  to assist with LE, proximal hip, and core control in self care, mobility, lifting, ambulation and eccentric single leg control.     NMR and Therapeutic Activities:    [x] (21639 or 06215) Provided verbal/tactile cueing for activities related to improving balance, coordination, kinesthetic sense, posture, motor skill, proprioception and motor activation to allow for proper function of core, proximal hip and LE with self care and ADLs and functional mobility.   [] (96089) Gait Re-education- Provided training and instruction to the patient for proper  LE, core and proximal hip recruitment and positioning and eccentric body weight control with ambulation re-education including up and down stairs     Home Exercise Program:    [x] (50889) Reviewed/Progressed HEP activities related to strengthening, flexibility, endurance, ROM of core, proximal hip and LE for functional self-care, mobility, lifting and ambulation/stair navigation   [] (29524)Reviewed/Progressed HEP activities related to improving balance, coordination, kinesthetic sense, posture, motor skill, proprioception of core, proximal hip and LE for self care, mobility, lifting, and ambulation/stair navigation      Manual Treatments:  PROM / STM / Oscillations-Mobs:  G-I, II, III, IV (PA's, Inf., Post.)  [] (06229) Provided manual therapy to mobilize LE, proximal hip and/or LS spine soft tissue/joints for the purpose of modulating pain, promoting relaxation,  increasing ROM, reducing/eliminating soft tissue swelling/inflammation/restriction, improving soft tissue extensibility and allowing for proper ROM for normal function with self care, mobility, lifting and ambulation. Modalities:     [x] GAME READY (VASO)- for significant edema, swelling, pain control. Charges:  Timed Code Treatment Minutes: 48   Total Treatment Minutes: 48      [] EVAL (LOW) 20973 (typically 20 minutes face-to-face)  [] EVAL (MOD) 53829 (typically 30 minutes face-to-face)  [] EVAL (HIGH) 92004 (typically 45 minutes face-to-face)  [] RE-EVAL     [x] NL(38113) x   2  [] DRY NEEDLE 1 OR 2 MUSCLES  [x] NMR (61258) x   1  [] DRY NEEDLE 3+ MUSCLES  [] Manual (12553) x       [] TA (26170) x     [] [facility] (93572[facility]  [] ES(attended) (56895)     [] ES (un) (84598):   [] VASO (80566) Min  [] Other:    If [facility] Please Indicate Time In/Out  CPT Code Time in Time out                                   Access Code: [email or web address]  URL: [email or web address]  Date: 02/13/2023  Prepared by: Kinga Carmichael  Hooklying Isometric Clamshell - 1-3 x daily - 7 x weekly - 1 sets - 10 reps - 10 seconds hold  Supine Hip Adduction Isometric with Ball - 1-3 x daily - 7 x weekly - 1 sets - 10 reps - 10 seconds hold  Supine Bridge with Mini Swiss Ball Between Knees - 1-3 x daily - 7 x weekly - 3 sets - 10 reps  Bridge with Hip Abduction and Resistance - 1-3 x daily - 7 x weekly - 3 sets - 10 reps  Hooklying Isometric Hip Flexion - 1 x daily - 7 x weekly - 3 sets - 10 reps  Prone Hip Extension on Table - 1 x daily - 3-5 x weekly - 3 sets - 10-15 reps  Side Stepping with Resistance at Ankles - 1 x daily - 3-5 x weekly - 3 sets - 10 reps  Reverse Lunge on Slider - 1 x daily - 3 x weekly - 2 sets - 15 reps  Mini Squat - 1 x daily - 3 x weekly - 3 sets - 10 reps      GOALS:  Patient stated goal: regular activity  [] Progressing: [] Met: [] Not Met: [] Adjusted    Therapist goals for Patient:   Short Term Goals: To be achieved in: 2 weeks  1. Independent in HEP and progression per patient tolerance, in order to prevent re-injury. [] Progressing: [] Met: [] Not Met: [] Adjusted  2. Patient will have a decrease in pain to facilitate improvement in movement, function, and ADLs as indicated by Functional Deficits. [] Progressing: [] Met: [] Not Met: [] Adjusted    Long Term Goals: To be achieved in: 8 weeks  1. Disability index score of 62.5% or less for the LEFS to assist with reaching prior level of function. [] Progressing: [] Met: [] Not Met: [] Adjusted  2. Patient will demonstrate increased AROM to Hip flexion 90 degrees, knee flexion 130 degrees to allow for proper joint functioning as indicated by patients Functional Deficits. [] Progressing: [] Met: [] Not Met: [] Adjusted  3. Patient will demonstrate an increase in hip flexion, abduction, quad, hamstring  5/5 Strength to good proximal hip strength and control, within 5lb HHD in LE to allow for proper functional mobility as indicated by patients Functional Deficits.    [] Progressing: [] Met: [] Not Met: [] Adjusted  4. Patient will return to Sit to stand 2x10 functional activities without increased symptoms or restriction. [] Progressing: [] Met: [] Not Met: [] Adjusted  5. Patient will return to playing pickleball (half game) without increased symptoms or restriction (patient specific functional goal)    [] Progressing: [] Met: [] Not Met: [] Adjusted     ASSESSMENT:  Gomez Cortez presented with no increased pain this date. States that he feels like he is more confident on the [facility]. Continued with quad stretch in prone this date with good relief to anterior hip, added log roll stretching and abduction stretch. Continued to progress strengthening this date. Added glute side walks, concentric leg press, and mini squats this date with good tolerance. Will cont to progress strengthening in [place] and [place] as tolerated. Will work on progressing out of [facility]. Return to Play: (if applicable)   []  Stage 1: Intro to Strength   []  Stage 2: Return to Run and Strength   []  Stage 3: Return to Jump and Strength   []  Stage 4: Dynamic Strength and Agility   []  Stage 5: Sport Specific Training     []  Ready to Return to Play, Meets All Above Stages   []  Not Ready for Return to Sports   Comments:            Treatment/Activity Tolerance:  [x] Patient tolerated treatment well [] Patient limited by fatique  [] Patient limited by pain  [] Patient limited by other medical complications  [] Other:     Overall Progression Towards Functional goals/ Treatment Progress Update:  [] Patient is progressing as expected towards functional goals listed. [] Progression is slowed due to complexities/Impairments listed. [] Progression has been slowed due to co-morbidities.   [x] Plan just implemented, too soon to assess goals progression <30days   [] Goals require adjustment due to lack of progress  [] Patient is not progressing as expected and requires additional follow up with physician  [] Other    Prognosis for POC: [x] Good [] Fair  [] Poor    Patient requires continued skilled intervention: [x] Yes  [] No        PLAN: See eval  [] Continue per plan of care [] Alter current plan (see comments)  [x] Plan of care initiated [] Hold pending MD visit [] Discharge    Electronically signed by: Camelia Grant PT DPT, MS    Note: If patient does not return for scheduled/recommended follow up visits, this note will serve as a discharge from care along with the most recent update on progress.

## 2023-02-15 ENCOUNTER — HOSPITAL ENCOUNTER (OUTPATIENT)
Dept: PHYSICAL THERAPY | Age: 78
Setting detail: THERAPIES SERIES
Discharge: HOME | End: 2023-02-15
Payer: MEDICARE

## 2023-02-15 PROCEDURE — 97110 THERAPEUTIC EXERCISES: CPT

## 2023-02-15 PROCEDURE — 97530 THERAPEUTIC ACTIVITIES: CPT

## 2023-02-15 PROCEDURE — 97016 VASOPNEUMATIC DEVICE THERAPY: CPT

## 2023-02-15 NOTE — FLOWSHEET NOTE
[place] [address] [facility]  Phone: [phone] Fax: [phone]    Physical Therapy Treatment Note/ Progress Report:       Date:  02/15/2023    Patient Name:  Vikki Jerry    :  [birth year]  MRN: [id number]  Restrictions/Precautions:    Medical/Treatment Diagnosis Information:  Diagnosis: H10.968- S. P left hip replacement       ICD-10-CM    1. Hip pain, left  M25.552       2. Decreased strength of lower extremity  R29.898       3. Decreased range of left hip movement  M25.652           Insurance/Certification information:  PT Insurance Information: Medicare/ AARP  Physician Information:  [place]  Plan of care signed (Y/N):     Date of Patient follow up with Physician:      Progress Report: []  Yes  []  No     Date Range for reporting period:  Beginnin2023  Ending:     Progress report due (10 Rx/or 30 days whichever is less):      Recertification due (POC duration/ or 90 days whichever is less): 2023     Visit # Insurance Allowable Auth Needed   4 MN []Yes    [x]No     Latex Allergy:  [x]NO      []YES  Preferred Language for Healthcare:   [x]English       []other:  Functional Scale: LEFS 30/80, 62.5% dysfunction  Date assessed:2023    Pain level:  4/10     SUBJECTIVE:  Zohra Plan states that he was a little sore after last visit.  He also     OBJECTIVE: See eval  Observation:   Test measurements:      RESTRICTIONS/PRECAUTIONS: Left CATRINA 2023    Exercises/Interventions:     Therapeutic Ex (68741)   Min: Sets/sec Reps Notes/CUES   Retro Stepper/BIKE   NV         Alter G      BFR      Sportcord March      3 way SLR      SAQ      Clam ABD      Hip Ext Mabeline Bookbinder      BOSU fwd/side lunge      BOSU squat      Leg Press Iso/Con/Ecc 0-      Cybex HS curl      TKE      Quad set with SLR 1 5 Diff, DC to standing   Prone hamstring curl 1 20    Glute side walks 5 5 Blue   RDL      Slide Lunge 2 10 Posterior   Slide HS eccentrics      Step ups/ecc step down      Swissball wall rolls- in SLS- hip drive      Standing 3 way 1 15 Extension bent over table BL   Leg Press Full Range 2 15 70-90#   TKE 5\" 20 HELD         BOSU Lunge Stomp SAQ    Mini squats SB 2 15    Manual Intervention (83164)  Min:      Knee mobs/PROM      Tib/Fem Mobs      Patella Mobs      Ankle mobs      Prone quad stretch 30\" 3    Hip   ROM NMR re-education (22240)  Min:   CUES NEEDED   Paraguayan/Biofeedback 10/10      BFR      G. Med activation      Hip Ext full ROM/ G. Activation      Bosu Bal and Prop- G Med      Single leg stance/Balance/Prop      Bosu Retro G. Med act      Prone Hip froggers- sliders/elevated      Hip flexor isometric 10\" 10    Hip Add iso c bridge Hip Add Isometric Hip Abd iso c bridge Hip Abd Isometric Therapeutic Activity (52036)  Min:      Ladders      Plyos      Dynamic Balance      Step ups 8\" Fwd/Lat 1 15 ea   Step ups 6\" 1 15    TRX Sit to Stand 1 20    Walking SPC 2 8 steps    Walking no SPC 2 8 steps    Stop overs c and c/out cane 4 8 steps        Therapeutic Exercise and NMR EXR  [x] (27200) Provided verbal/tactile cueing for activities related to strengthening, flexibility, endurance, ROM for improvements in LE, proximal hip, and core control with self care, mobility, lifting, ambulation. [x] (08924) Provided verbal/tactile cueing for activities related to improving balance, coordination, kinesthetic sense, posture, motor skill, proprioception  to assist with LE, proximal hip, and core control in self care, mobility, lifting, ambulation and eccentric single leg control.      NMR and Therapeutic Activities:    [x] (91818 or 13686) Provided verbal/tactile cueing for activities related to improving balance, coordination, kinesthetic sense, posture, motor skill, proprioception and motor activation to allow for proper function of core, proximal hip and LE with self care and ADLs and functional mobility.   [] (39421) Gait Re-education- Provided training and instruction to the patient for proper LE, core and proximal hip recruitment and positioning and eccentric body weight control with ambulation re-education including up and down stairs     Home Exercise Program:    [x] (64881) Reviewed/Progressed HEP activities related to strengthening, flexibility, endurance, ROM of core, proximal hip and LE for functional self-care, mobility, lifting and ambulation/stair navigation   [] (21749)Reviewed/Progressed HEP activities related to improving balance, coordination, kinesthetic sense, posture, motor skill, proprioception of core, proximal hip and LE for self care, mobility, lifting, and ambulation/stair navigation      Manual Treatments:  PROM / STM / Oscillations-Mobs:  G-I, II, III, IV (PA's, Inf., Post.)  [] (24778) Provided manual therapy to mobilize LE, proximal hip and/or LS spine soft tissue/joints for the purpose of modulating pain, promoting relaxation,  increasing ROM, reducing/eliminating soft tissue swelling/inflammation/restriction, improving soft tissue extensibility and allowing for proper ROM for normal function with self care, mobility, lifting and ambulation. Modalities:     [x] GAME READY (VASO)- for significant edema, swelling, pain control. Charges:  Timed Code Treatment Minutes: 50   Total Treatment Minutes: 65      [] EVAL (LOW) 47315 (typically 20 minutes face-to-face)  [] EVAL (MOD) 31207 (typically 30 minutes face-to-face)  [] EVAL (HIGH) 08402 (typically 45 minutes face-to-face)  [] RE-EVAL     [x] RT(66486) x   1  [] DRY NEEDLE 1 OR 2 MUSCLES  [] NMR (96506) x     [] DRY NEEDLE 3+ MUSCLES  [] Manual (85206) x       [x] TA (02942) x  2  [] Mech Traction (75682)  [] ES(attended) (43923)     [] ES (un) (53447):   [x] VASO (07364) Min  [] Other:    If [facility] Please Indicate Time In/Out  CPT Code Time in Time out                                   Access Code: [email or web address]  URL: [email or web address]  Date: 02/13/2023  Prepared by: Lexi Jumper    Exercises  Hooklying Isometric Clamshell - 1-3 x daily - 7 x weekly - 1 sets - 10 reps - 10 seconds hold  Supine Hip Adduction Isometric with Ball - 1-3 x daily - 7 x weekly - 1 sets - 10 reps - 10 seconds hold  Supine Bridge with Mini Swiss Ball Between Knees - 1-3 x daily - 7 x weekly - 3 sets - 10 reps  Bridge with Hip Abduction and Resistance - 1-3 x daily - 7 x weekly - 3 sets - 10 reps  Hooklying Isometric Hip Flexion - 1 x daily - 7 x weekly - 3 sets - 10 reps  Prone Hip Extension on Table - 1 x daily - 3-5 x weekly - 3 sets - 10-15 reps  Side Stepping with Resistance at Ankles - 1 x daily - 3-5 x weekly - 3 sets - 10 reps  Reverse Lunge on Slider - 1 x daily - 3 x weekly - 2 sets - 15 reps  Mini Squat - 1 x daily - 3 x weekly - 3 sets - 10 reps      GOALS:  Patient stated goal: regular activity  [] Progressing: [] Met: [] Not Met: [] Adjusted    Therapist goals for Patient:   Short Term Goals: To be achieved in: 2 weeks  1. Independent in HEP and progression per patient tolerance, in order to prevent re-injury. [] Progressing: [] Met: [] Not Met: [] Adjusted  2. Patient will have a decrease in pain to facilitate improvement in movement, function, and ADLs as indicated by Functional Deficits. [] Progressing: [] Met: [] Not Met: [] Adjusted    Long Term Goals: To be achieved in: 8 weeks  1. Disability index score of 62.5% or less for the LEFS to assist with reaching prior level of function. [] Progressing: [] Met: [] Not Met: [] Adjusted  2. Patient will demonstrate increased AROM to Hip flexion 90 degrees, knee flexion 130 degrees to allow for proper joint functioning as indicated by patients Functional Deficits. [] Progressing: [] Met: [] Not Met: [] Adjusted  3.  Patient will demonstrate an increase in hip flexion, abduction, quad, hamstring  5/5 Strength to good proximal hip strength and control, within 5lb HHD in LE to allow for proper functional mobility as indicated by patients Functional Deficits. [] Progressing: [] Met: [] Not Met: [] Adjusted  4. Patient will return to Sit to stand 2x10 functional activities without increased symptoms or restriction. [] Progressing: [] Met: [] Not Met: [] Adjusted  5. Patient will return to playing pickleball (half game) without increased symptoms or restriction (patient specific functional goal)    [] Progressing: [] Met: [] Not Met: [] Adjusted     ASSESSMENT:  Erickson Sanchez presented with increased soreness, he states difficulty to distinguish if from progressing exercises or walking longer distances. Implemented standing hip 3 way with good tolerance. Tried SLR with difficulty and slight pain to anterior hip. DC at this time. Addressed gait mechanics without SPC this date with very mild lateral trunk shift to left, mostly likely due to decreased glute min/med. Continued to progress functional strengthening as tolerated. Will cont to benefit from skilled PT services to address LE strength and ROM. Finished with VASO as Erickson Sanchez continues to have swelling. Return to Play: (if applicable)   []  Stage 1: Intro to Strength   []  Stage 2: Return to Run and Strength   []  Stage 3: Return to Jump and Strength   []  Stage 4: Dynamic Strength and Agility   []  Stage 5: Sport Specific Training     []  Ready to Return to Play, Meets All Above Stages   []  Not Ready for Return to Sports   Comments:            Treatment/Activity Tolerance:  [x] Patient tolerated treatment well [] Patient limited by fatique  [] Patient limited by pain  [] Patient limited by other medical complications  [] Other:     Overall Progression Towards Functional goals/ Treatment Progress Update:  [] Patient is progressing as expected towards functional goals listed. [] Progression is slowed due to complexities/Impairments listed. [] Progression has been slowed due to co-morbidities.   [x] Plan just implemented, too soon to assess goals progression <30days   [] Goals require adjustment due to lack of progress  [] Patient is not progressing as expected and requires additional follow up with physician  [] Other    Prognosis for POC: [x] Good [] Fair  [] Poor    Patient requires continued skilled intervention: [x] Yes  [] No        PLAN: See eval  [] Continue per plan of care [] Alter current plan (see comments)  [x] Plan of care initiated [] Hold pending MD visit [] Discharge    Electronically signed by: Kevin Orozco PT DPT, MS    Note: If patient does not return for scheduled/recommended follow up visits, this note will serve as a discharge from care along with the most recent update on progress.

## 2023-02-22 ENCOUNTER — HOSPITAL ENCOUNTER (OUTPATIENT)
Dept: PHYSICAL THERAPY | Age: 78
Setting detail: THERAPIES SERIES
Discharge: HOME | End: 2023-02-22
Payer: MEDICARE

## 2023-02-22 PROCEDURE — 97016 VASOPNEUMATIC DEVICE THERAPY: CPT

## 2023-02-22 PROCEDURE — 97110 THERAPEUTIC EXERCISES: CPT

## 2023-02-22 PROCEDURE — 97530 THERAPEUTIC ACTIVITIES: CPT

## 2023-02-22 NOTE — FLOWSHEET NOTE
[place] [address] [facility]  Phone: [phone] Fax: [phone]    Physical Therapy Treatment Note/ Progress Report:       Date:  2023    Patient Name:  Brittany Alvarado    :    MRN: [id number]  Restrictions/Precautions:    Medical/Treatment Diagnosis Information:  Diagnosis: R75.169- S. P left hip replacement       ICD-10-CM    1. Hip pain, left  M25.552       2. Decreased strength of lower extremity  R29.898       3. Decreased range of left hip movement  M25.652           Insurance/Certification information:  PT Insurance Information: Medicare/ AARP  Physician Information:  Alexandru Raymond*  Plan of care signed (Y/N):     Date of Patient follow up with Physician:      Progress Report: []  Yes  [x]  No     Date Range for reporting period:  Beginnin2023  Ending:     Progress report due (10 Rx/or 30 days whichever is less):      Recertification due (POC duration/ or 90 days whichever is less): 2023     Visit # Insurance Allowable Auth Needed   5 MN []Yes    [x]No     Latex Allergy:  [x]NO      []YES  Preferred Language for Healthcare:   [x]English       []other:  Functional Scale: LEFS 30/80, 62.5% dysfunction  Date assessed:2023    Pain level:  4/10     SUBJECTIVE:  Felipe North states that he has been feeling good since last visit. He states that he has been making steady progress. Has done his exercises almost every day.      OBJECTIVE: See eval  Observation:   Test measurements:      RESTRICTIONS/PRECAUTIONS: Left CATRINA 2023    Exercises/Interventions:     Therapeutic Ex (85570)   Min: Sets/sec Reps Notes/CUES   Retro Stepper/BIKE 5'           Alter G      BFR      Sportcord March      3 way SLR      SAQ      Clam ABD      Hip Ext Gerarda Hitchcock      BOSU fwd/side lunge      BOSU squat            Cybex HS curl      TKE      Quad set with SLR 1 10    Standing hamstring curl 1 20    Glute side walks 3 15' Blue   RDL            Slide HS eccentrics      Step ups/ecc step down      Swissball wall rolls- in SLS- hip drive      Standing 3 way 1 20 Extension bent over table BL   Leg Press Full Range Ecc 2 15 100#   TKE 5\" 20 HELD         BOSU Lunge Stomp 10\" 10    SAQ    Manual Intervention (34724)  Min:      Knee mobs/PROM      Tib/Fem Mobs      Patella Mobs      Ankle mobs         Hip   ROM  4' Within restrictions (Log Roll, abd)   NMR re-education (90041)  Min:   CUES NEEDED   Martiniquais/Biofeedback 10/10      BFR      G. Med activation      Hip Ext full ROM/ G. Activation      Bosu Bal and Prop- G Med      Single leg stance/Balance/Prop      Bosu Retro G. Med act      Prone Hip froggers- sliders/elevated      Hip flexor isometric    Hip Add iso c bridge Hip Add Isometric Hip Abd iso c bridge Hip Abd Isometric Therapeutic Activity (23034)  Min:      Ladders      [employer]      Dynamic Balance      Sliders Lateral/posterior 2 10ea Left only. Cues posterior weight shift on lateral   Squats SB 3\" 10    Step ups 8\" Lat 1 15 ea   Posterior Step down ecc 8\" 1 15    TRX Sit to Stand    Walking SPC    Walking no SPC    Stop overs c and c/out cane        Therapeutic Exercise and NMR EXR  [x] (05574) Provided verbal/tactile cueing for activities related to strengthening, flexibility, endurance, ROM for improvements in LE, proximal hip, and core control with self care, mobility, lifting, ambulation. [x] (68797) Provided verbal/tactile cueing for activities related to improving balance, coordination, kinesthetic sense, posture, motor skill, proprioception  to assist with LE, proximal hip, and core control in self care, mobility, lifting, ambulation and eccentric single leg control.      NMR and Therapeutic Activities:    [x] (80831 or 77083) Provided verbal/tactile cueing for activities related to improving balance, coordination, kinesthetic sense, posture, motor skill, proprioception and motor activation to allow for proper function of core, proximal hip and LE with self care and ADLs and functional mobility.   [] (94222) Gait Re-education- Provided training and instruction to the patient for proper LE, core and proximal hip recruitment and positioning and eccentric body weight control with ambulation re-education including up and down stairs     Home Exercise Program:    [x] (54937) Reviewed/Progressed HEP activities related to strengthening, flexibility, endurance, ROM of core, proximal hip and LE for functional self-care, mobility, lifting and ambulation/stair navigation   [] (39749)Reviewed/Progressed HEP activities related to improving balance, coordination, kinesthetic sense, posture, motor skill, proprioception of core, proximal hip and LE for self care, mobility, lifting, and ambulation/stair navigation      Manual Treatments:  PROM / STM / Oscillations-Mobs:  G-I, II, III, IV (PA's, Inf., Post.)  [] (95906) Provided manual therapy to mobilize LE, proximal hip and/or LS spine soft tissue/joints for the purpose of modulating pain, promoting relaxation,  increasing ROM, reducing/eliminating soft tissue swelling/inflammation/restriction, improving soft tissue extensibility and allowing for proper ROM for normal function with self care, mobility, lifting and ambulation. Modalities:     [x] GAME READY (VASO)- for significant edema, swelling, pain control.      Charges:  Timed Code Treatment Minutes: 45   Total Treatment Minutes: 60      [] EVAL (LOW) 21447 (typically 20 minutes face-to-face)  [] EVAL (MOD) 91885 (typically 30 minutes face-to-face)  [] EVAL (HIGH) 90986 (typically 45 minutes face-to-face)  [] RE-EVAL     [x] CALABRESE(32419) x   1  [] DRY NEEDLE 1 OR 2 MUSCLES  [] NMR (73706) x     [] DRY NEEDLE 3+ MUSCLES  [] Manual (98509) x       [x] TA (97803) x  2  [] Mech Traction (12519)  [] ES(attended) (74882)     [] ES (un) (10159):   [x] VASO (35550) Min  [] Other:    If [facility] Please Indicate Time In/Out  CPT Code Time in Time out                                   Access Code: [email or web address]  URL: [email or web address]  Date: 02/13/2023  Prepared by: Merlinda Bowler    Exercises  Hooklying Isometric Clamshell - 1-3 x daily - 7 x weekly - 1 sets - 10 reps - 10 seconds hold  Supine Hip Adduction Isometric with Ball - 1-3 x daily - 7 x weekly - 1 sets - 10 reps - 10 seconds hold  Supine Bridge with Mini Swiss Ball Between Knees - 1-3 x daily - 7 x weekly - 3 sets - 10 reps  Bridge with Hip Abduction and Resistance - 1-3 x daily - 7 x weekly - 3 sets - 10 reps  Hooklying Isometric Hip Flexion - 1 x daily - 7 x weekly - 3 sets - 10 reps  Prone Hip Extension on Table - 1 x daily - 3-5 x weekly - 3 sets - 10-15 reps  Side Stepping with Resistance at Ankles - 1 x daily - 3-5 x weekly - 3 sets - 10 reps  Reverse Lunge on Slider - 1 x daily - 3 x weekly - 2 sets - 15 reps  Mini Squat - 1 x daily - 3 x weekly - 3 sets - 10 reps      GOALS:  Patient stated goal: regular activity  [] Progressing: [] Met: [] Not Met: [] Adjusted    Therapist goals for Patient:   Short Term Goals: To be achieved in: 2 weeks  1. Independent in HEP and progression per patient tolerance, in order to prevent re-injury. [] Progressing: [] Met: [] Not Met: [] Adjusted  2. Patient will have a decrease in pain to facilitate improvement in movement, function, and ADLs as indicated by Functional Deficits. [] Progressing: [] Met: [] Not Met: [] Adjusted    Long Term Goals: To be achieved in: 8 weeks  1. Disability index score of 62.5% or less for the LEFS to assist with reaching prior level of function. [] Progressing: [] Met: [] Not Met: [] Adjusted  2. Patient will demonstrate increased AROM to Hip flexion 90 degrees, knee flexion 130 degrees to allow for proper joint functioning as indicated by patients Functional Deficits. [] Progressing: [] Met: [] Not Met: [] Adjusted  3.  Patient will demonstrate an increase in hip flexion, abduction, quad, hamstring  5/5 Strength to good proximal hip strength and control, within 5lb HHD in LE to allow for proper functional mobility as indicated by patients Functional Deficits. [] Progressing: [] Met: [] Not Met: [] Adjusted  4. Patient will return to Sit to stand 2x10 functional activities without increased symptoms or restriction. [] Progressing: [] Met: [] Not Met: [] Adjusted  5. Patient will return to playing pickleball (half game) without increased symptoms or restriction (patient specific functional goal)    [] Progressing: [] Met: [] Not Met: [] Adjusted     ASSESSMENT:  Reintegrated SLR with good tolerance, broken into 2 sets of 5. Progressed side stepping to open gym as opposed to along table with UE support. Tamela Rosa presented without AD this date. Added squats with a SB. Added posterior step down and lateral step down with eccentric focus. Tamela Rosa demonstrated decreased knee control at mid range that improved with cueing. Progressed Leg Press to 100 pounds. Instructed to trial walking with intermittent breaks, encouraged to bring [facility] incase he needs it. Continued to progress functional strengthening as tolerated. Will cont to benefit from skilled PT services to address LE strength and ROM. Finished with VASO as Tamela Rsoa continues to have swelling. Return to Play: (if applicable)   []  Stage 1: Intro to Strength   []  Stage 2: Return to Run and Strength   []  Stage 3: Return to Jump and Strength   []  Stage 4: Dynamic Strength and Agility   []  Stage 5: Sport Specific Training     []  Ready to Return to Play, Meets All Above Stages   []  Not Ready for Return to Sports   Comments:            Treatment/Activity Tolerance:  [x] Patient tolerated treatment well [] Patient limited by fatique  [] Patient limited by pain  [] Patient limited by other medical complications  [] Other:     Overall Progression Towards Functional goals/ Treatment Progress Update:  [] Patient is progressing as expected towards functional goals listed.     [] Progression is slowed due to complexities/Impairments listed. [] Progression has been slowed due to co-morbidities. [x] Plan just implemented, too soon to assess goals progression <30days   [] Goals require adjustment due to lack of progress  [] Patient is not progressing as expected and requires additional follow up with physician  [] Other    Prognosis for POC: [x] Good [] Fair  [] Poor    Patient requires continued skilled intervention: [x] Yes  [] No        PLAN: See eval  [] Continue per plan of care [] Alter current plan (see comments)  [x] Plan of care initiated [] Hold pending MD visit [] Discharge    Electronically signed by: Nate Ayoub, PT DPT, MS    Note: If patient does not return for scheduled/recommended follow up visits, this note will serve as a discharge from care along with the most recent update on progress.

## 2023-02-24 ENCOUNTER — HOSPITAL ENCOUNTER (OUTPATIENT)
Dept: PHYSICAL THERAPY | Age: 78
Setting detail: THERAPIES SERIES
Discharge: HOME | End: 2023-02-24
Payer: MEDICARE

## 2023-02-24 PROCEDURE — 97016 VASOPNEUMATIC DEVICE THERAPY: CPT

## 2023-02-24 PROCEDURE — 97110 THERAPEUTIC EXERCISES: CPT

## 2023-02-24 PROCEDURE — 97530 THERAPEUTIC ACTIVITIES: CPT

## 2023-02-24 PROCEDURE — 97140 MANUAL THERAPY 1/> REGIONS: CPT

## 2023-02-24 NOTE — FLOWSHEET NOTE
[place] [address] [facility]  Phone: [phone] Fax: [phone]    Physical Therapy Treatment Note/ Progress Report:       Date:  2023    Patient Name:  Chelsea Yang    :    MRN: [id number]  Restrictions/Precautions:    Medical/Treatment Diagnosis Information:  Diagnosis: U38.142- S. P left hip replacement       ICD-10-CM    1. Hip pain, left  M25.552       2. Decreased strength of lower extremity  R29.898       3. Decreased range of left hip movement  M25.652           Insurance/Certification information:  PT Insurance Information: Medicare/ AARP  Physician Information:  Suly Madden  Plan of care signed (Y/N):     Date of Patient follow up with Physician:      Progress Report: []  Yes  [x]  No     Date Range for reporting period:  Beginnin2023  Ending:     Progress report due (10 Rx/or 30 days whichever is less):      Recertification due (POC duration/ or 90 days whichever is less): 2023     Visit # Insurance Allowable Auth Needed   6 MN []Yes    [x]No     Latex Allergy:  [x]NO      []YES  Preferred Language for Healthcare:   [x]English       []other:  Functional Scale: LEFS 30/80, 62.5% dysfunction  Date assessed:2023    Pain level:  3/10     SUBJECTIVE:  José Miguel Collier states that he was sore after last session. He needed to use tylenol last night to reduce soreness. Only muscular soreness, none present today.       OBJECTIVE: See eval  Observation:   Test measurements:      RESTRICTIONS/PRECAUTIONS: Left CATRINA 2023    Exercises/Interventions:     Therapeutic Ex (76251)   Min: Sets/sec Reps Notes/CUES   Retro Stepper/BIKE 5'           Alter G      BFR      Sportcord March      3 way SLR      SAQ      Clam ABD      Hip Ext Sanchez Greenlandic      BOSU fwd/side lunge      BOSU squat            Cybex HS curl      TKE      Quad set with SLR    Standing hamstring curl    Glute side walks 3 15' Yellow thick   RDL            Slide HS eccentrics      Step ups/ecc step down      Swissball wall rolls- in SLS- hip drive      Standing 3 way Leg Press Full Range Ecc 2 15 110#   TKE       BOSU Lunge Stomp 10\" 10    SAQ    Manual Intervention (58186)  Min:      Knee mobs/PROM      Tib/Fem Mobs      Patella Mobs      Ankle mobs         Hip   ROM  8' Within restrictions (Log Roll, abd) Flexion   NMR re-education (16733)  Min:   CUES NEEDED   Stride Stance c rebounder 1 20 BL   Stride Stance c rebounder front on airex 1 20 BL   [place]an/Biofeedback 10/10      BFR      G. Med activation      Hip Ext full ROM/ G. Activation      Bosu Bal and Prop- G Med      Single leg stance/Balance/Prop      Bosu Retro G. Med act      Prone Hip froggers- sliders/elevated      Hip flexor isometric    Hip Add iso c bridge Hip Add Isometric Hip Abd iso c bridge Hip Abd Isometric Therapeutic Activity (42691)  Min:      Ladders      Plyos      Dynamic Balance      Sliders Lateral/posterior Squats SB 2 10 To bench c airex. Red MB press   Step ups 8\" Lat 1 20 Cues posterior weight shift. ea   Posterior Step down ecc 8\" 1 20    TRX Sit to Stand    Walking SPC    Walking no SPC    Stop overs c and c/out cane        Therapeutic Exercise and NMR EXR  [x] (15717) Provided verbal/tactile cueing for activities related to strengthening, flexibility, endurance, ROM for improvements in LE, proximal hip, and core control with self care, mobility, lifting, ambulation. [x] (36563) Provided verbal/tactile cueing for activities related to improving balance, coordination, kinesthetic sense, posture, motor skill, proprioception  to assist with LE, proximal hip, and core control in self care, mobility, lifting, ambulation and eccentric single leg control.      NMR and Therapeutic Activities:    [x] (50685 or 69506) Provided verbal/tactile cueing for activities related to improving balance, coordination, kinesthetic sense, posture, motor skill, proprioception and motor activation to allow for proper function of core, proximal hip and LE with self care and ADLs and functional mobility.   [] (64055) Gait Re-education- Provided training and instruction to the patient for proper LE, core and proximal hip recruitment and positioning and eccentric body weight control with ambulation re-education including up and down stairs     Home Exercise Program:    [x] (11899) Reviewed/Progressed HEP activities related to strengthening, flexibility, endurance, ROM of core, proximal hip and LE for functional self-care, mobility, lifting and ambulation/stair navigation   [] (99684)Reviewed/Progressed HEP activities related to improving balance, coordination, kinesthetic sense, posture, motor skill, proprioception of core, proximal hip and LE for self care, mobility, lifting, and ambulation/stair navigation      Manual Treatments:  PROM / STM / Oscillations-Mobs:  G-I, II, III, IV (PA's, Inf., Post.)  [] (12788) Provided manual therapy to mobilize LE, proximal hip and/or LS spine soft tissue/joints for the purpose of modulating pain, promoting relaxation,  increasing ROM, reducing/eliminating soft tissue swelling/inflammation/restriction, improving soft tissue extensibility and allowing for proper ROM for normal function with self care, mobility, lifting and ambulation. Modalities:     [x] GAME READY (VASO)- for significant edema, swelling, pain control.      Charges:  Timed Code Treatment Minutes: 48   Total Treatment Minutes: 63      [] EVAL (LOW) 83098 (typically 20 minutes face-to-face)  [] EVAL (MOD) 86172 (typically 30 minutes face-to-face)  [] EVAL (HIGH) 81997 (typically 45 minutes face-to-face)  [] RE-EVAL     [x] UA(40669) x   1  [] DRY NEEDLE 1 OR 2 MUSCLES  [] NMR (23375) x     [] DRY NEEDLE 3+ MUSCLES  [x] Manual (46530) x 1       [x] TA (84872) x  1  [] Mech Traction (38941)  [] ES(attended) (00164)     [] ES (un) (49247):   [x] VASO (65992) Min  [] Other:    If [facility] Please Indicate Time In/Out  CPT Code Time in Time out                                   Access Code: [email or web address]  URL: [email or web address]  Date: 02/13/2023  Prepared by: Andrew Tobin    Exercises  Hooklying Isometric Clamshell - 1-3 x daily - 7 x weekly - 1 sets - 10 reps - 10 seconds hold  Supine Hip Adduction Isometric with Ball - 1-3 x daily - 7 x weekly - 1 sets - 10 reps - 10 seconds hold  Supine Bridge with Mini Swiss Ball Between Knees - 1-3 x daily - 7 x weekly - 3 sets - 10 reps  Bridge with Hip Abduction and Resistance - 1-3 x daily - 7 x weekly - 3 sets - 10 reps  Hooklying Isometric Hip Flexion - 1 x daily - 7 x weekly - 3 sets - 10 reps  Prone Hip Extension on Table - 1 x daily - 3-5 x weekly - 3 sets - 10-15 reps  Side Stepping with Resistance at Ankles - 1 x daily - 3-5 x weekly - 3 sets - 10 reps  Reverse Lunge on Slider - 1 x daily - 3 x weekly - 2 sets - 15 reps  Mini Squat - 1 x daily - 3 x weekly - 3 sets - 10 reps      GOALS:  Patient stated goal: regular activity  [] Progressing: [] Met: [] Not Met: [] Adjusted    Therapist goals for Patient:   Short Term Goals: To be achieved in: 2 weeks  1. Independent in HEP and progression per patient tolerance, in order to prevent re-injury. [] Progressing: [] Met: [] Not Met: [] Adjusted  2. Patient will have a decrease in pain to facilitate improvement in movement, function, and ADLs as indicated by Functional Deficits. [] Progressing: [] Met: [] Not Met: [] Adjusted    Long Term Goals: To be achieved in: 8 weeks  1. Disability index score of 62.5% or less for the LEFS to assist with reaching prior level of function. [] Progressing: [] Met: [] Not Met: [] Adjusted  2. Patient will demonstrate increased AROM to Hip flexion 90 degrees, knee flexion 130 degrees to allow for proper joint functioning as indicated by patients Functional Deficits. [] Progressing: [] Met: [] Not Met: [] Adjusted  3.  Patient will demonstrate an increase in hip flexion, abduction, quad, hamstring  5/5 Strength to good proximal hip strength and control, within 5lb HHD in LE to allow for proper functional mobility as indicated by patients Functional Deficits. [] Progressing: [] Met: [] Not Met: [] Adjusted  4. Patient will return to Sit to stand 2x10 functional activities without increased symptoms or restriction. [] Progressing: [] Met: [] Not Met: [] Adjusted  5. Patient will return to playing pickleball (half game) without increased symptoms or restriction (patient specific functional goal)    [] Progressing: [] Met: [] Not Met: [] Adjusted     ASSESSMENT:  Session mainly focused on functional strengthening this date. However, Dora Fenton did present with hip flexion restriction which improved with ROM/stretching. Progressed squats to no UE assist to bench with good tolerance. Progressed lateral and posterior step downs on 8\" step without UE assist. Added Stride stance and toe touch balance. Rebounder with stride stance balance. Added Airex to increase difficulty. Dora Fenton found these exercises relevant to returning to pickleball. Progressed lateral stepping to increased resistance with good tolerance. Plan to integrate lunges next visit to initiate dynamic movements relevant to pickleball. Plan to also integrate tighrope walking for balance. Vaso continued due to joint effusion after exercise.    Return to Play: (if applicable)   []  Stage 1: Intro to Strength   []  Stage 2: Return to Run and Strength   []  Stage 3: Return to Jump and Strength   []  Stage 4: Dynamic Strength and Agility   []  Stage 5: Sport Specific Training     []  Ready to Return to Play, Meets All Above Stages   []  Not Ready for Return to Sports   Comments:            Treatment/Activity Tolerance:  [x] Patient tolerated treatment well [] Patient limited by fatique  [] Patient limited by pain  [] Patient limited by other medical complications  [] Other:     Overall Progression Towards Functional goals/ Treatment Progress Update:  [] Patient is progressing as expected towards functional goals listed. [] Progression is slowed due to complexities/Impairments listed. [] Progression has been slowed due to co-morbidities. [x] Plan just implemented, too soon to assess goals progression <30days   [] Goals require adjustment due to lack of progress  [] Patient is not progressing as expected and requires additional follow up with physician  [] Other    Prognosis for POC: [x] Good [] Fair  [] Poor    Patient requires continued skilled intervention: [x] Yes  [] No        PLAN: See eval  [] Continue per plan of care [] Alter current plan (see comments)  [x] Plan of care initiated [] Hold pending MD visit [] Discharge    Electronically signed by: Amarilis Albert PT DPT, MS    Note: If patient does not return for scheduled/recommended follow up visits, this note will serve as a discharge from care along with the most recent update on progress.

## 2023-02-27 ENCOUNTER — HOSPITAL ENCOUNTER (OUTPATIENT)
Dept: PHYSICAL THERAPY | Age: 78
Setting detail: THERAPIES SERIES
Discharge: HOME | End: 2023-02-27
Payer: MEDICARE

## 2023-02-27 PROCEDURE — 97016 VASOPNEUMATIC DEVICE THERAPY: CPT

## 2023-02-27 PROCEDURE — 97110 THERAPEUTIC EXERCISES: CPT

## 2023-02-27 PROCEDURE — 97530 THERAPEUTIC ACTIVITIES: CPT

## 2023-02-27 NOTE — FLOWSHEET NOTE
[place] [address] [facility]  Phone: [phone] Fax: [phone]    Physical Therapy Treatment Note/ Progress Report:       Date:  2023    Patient Name:  Amie Dobbs    :    MRN: [id number]  Restrictions/Precautions:    Medical/Treatment Diagnosis Information:  Diagnosis: B13.327- S. P left hip replacement       ICD-10-CM    1. Hip pain, left  M25.552       2. Decreased strength of lower extremity  R29.898       3. Decreased range of left hip movement  M25.652           Insurance/Certification information:  PT Insurance Information: Medicare/ AARP  Physician Information:  Glenna Brunner*  Plan of care signed (Y/N):     Date of Patient follow up with Physician:      Progress Report: []  Yes  [x]  No     Date Range for reporting period:  Beginnin2023  Ending:     Progress report due (10 Rx/or 30 days whichever is less):      Recertification due (POC duration/ or 90 days whichever is less): 2023     Visit # Insurance Allowable Auth Needed   7 MN []Yes    [x]No     Latex Allergy:  [x]NO      []YES  Preferred Language for Healthcare:   [x]English       []other:  Functional Scale: LEFS 30/80, 62.5% dysfunction  Date assessed:2023    Pain level:  3/10     SUBJECTIVE:  Pepper Melton continues to report soreness after sessions. Continued soreness into this date.      OBJECTIVE: See eval  Observation:   Test measurements:      RESTRICTIONS/PRECAUTIONS: Left CATRINA 2023    Exercises/Interventions:     Therapeutic Ex (67333)   Min: Sets/sec Reps Notes/CUES   Retro Stepper/BIKE 5'           Alter G      BFR      Sportcord March      3 way SLR      SAQ      Clam ABD      Hip Ext Farrah Fiddler      BOSU fwd/side lunge      BOSU squat            Cybex HS curl      TKE      Quad set with SLR    Standing hamstring curl    Glute side walks 3 15' Yellow thick   RDL            Slide HS eccentrics      Step ups/ecc step down      Swissball wall rolls- in SLS- hip drive      Standing 3 way 1 20 Extension bent over table BL, 3#   Leg Press Full Range Ecc 2 15 120#   TKE       BOSU Lunge Stomp 10\" 10    SAQ    Manual Intervention (99139)  Min:      Knee mobs/PROM      Tib/Fem Mobs      Patella Mobs      Ankle mobs         Hip   ROM  4' Within restrictions (Log Roll, abd) Flexion   NMR re-education (54070)  Min:   CUES NEEDED   Stride Stance c rebounder 1 20 BL   Stride Stance c rebounder front on airex 1 20 BL   Georgian/Biofeedback 10/10      BFR      G. Med activation      Hip Ext full ROM/ G. Activation      Bosu Bal and Prop- G Med      Single leg stance/Balance/Prop      Bosu Retro G. Med act      Prone Hip froggers- sliders/elevated      Hip flexor isometric    Hip Add iso c bridge Hip Add Isometric Hip Abd iso c bridge Hip Abd Isometric Therapeutic Activity (90327)  Min:      Ladders      Plyos      Lateral lunge left (mini) 1 15 With UE assist   Sliders Lateral/posterior Squats KB press 2 15 YKB, RKB to chair   Squats TRX 2 10 To chair   Step ups 8\" Lat 1 20 Cues posterior weight shift. ea   Posterior Step down ecc 8\" 1 20    TRX Sit to Stand    Walking SPC    Walking no SPC    Stop overs c and c/out cane        Therapeutic Exercise and NMR EXR  [x] (64440) Provided verbal/tactile cueing for activities related to strengthening, flexibility, endurance, ROM for improvements in LE, proximal hip, and core control with self care, mobility, lifting, ambulation. [x] (20552) Provided verbal/tactile cueing for activities related to improving balance, coordination, kinesthetic sense, posture, motor skill, proprioception  to assist with LE, proximal hip, and core control in self care, mobility, lifting, ambulation and eccentric single leg control.      NMR and Therapeutic Activities:    [x] (25571 or 44978) Provided verbal/tactile cueing for activities related to improving balance, coordination, kinesthetic sense, posture, motor skill, proprioception and motor activation to allow for proper function of core, proximal hip and LE with self care and ADLs and functional mobility.   [] (99592) Gait Re-education- Provided training and instruction to the patient for proper LE, core and proximal hip recruitment and positioning and eccentric body weight control with ambulation re-education including up and down stairs     Home Exercise Program:    [x] (59858) Reviewed/Progressed HEP activities related to strengthening, flexibility, endurance, ROM of core, proximal hip and LE for functional self-care, mobility, lifting and ambulation/stair navigation   [] (71354)Reviewed/Progressed HEP activities related to improving balance, coordination, kinesthetic sense, posture, motor skill, proprioception of core, proximal hip and LE for self care, mobility, lifting, and ambulation/stair navigation      Manual Treatments:  PROM / STM / Oscillations-Mobs:  G-I, II, III, IV (PA's, Inf., Post.)  [] (65089) Provided manual therapy to mobilize LE, proximal hip and/or LS spine soft tissue/joints for the purpose of modulating pain, promoting relaxation,  increasing ROM, reducing/eliminating soft tissue swelling/inflammation/restriction, improving soft tissue extensibility and allowing for proper ROM for normal function with self care, mobility, lifting and ambulation. Modalities:     [x] GAME READY (VASO)- for significant edema, swelling, pain control.      Charges:  Timed Code Treatment Minutes: 45   Total Treatment Minutes: 60      [] EVAL (LOW) 98770 (typically 20 minutes face-to-face)  [] EVAL (MOD) 70141 (typically 30 minutes face-to-face)  [] EVAL (HIGH) 93720 (typically 45 minutes face-to-face)  [] RE-EVAL     [x] KV(96707) x   1  [] DRY NEEDLE 1 OR 2 MUSCLES  [] NMR (26526) x     [] DRY NEEDLE 3+ MUSCLES  [] Manual (52908) x       [x] TA (63685) x  2  [] Mech Traction (01015)  [] ES(attended) (29278)     [] ES (un) (14667):   [x] VASO (49278) Min  [] Other:    If [facility] Please Indicate Time In/Out  CPT Code Time in Time out                                   Access Code: [email or web address]  URL: [email or web address]  Date: 02/13/2023  Prepared by: Thalia Needle    Exercises  Hooklying Isometric Clamshell - 1-3 x daily - 7 x weekly - 1 sets - 10 reps - 10 seconds hold  Supine Hip Adduction Isometric with Ball - 1-3 x daily - 7 x weekly - 1 sets - 10 reps - 10 seconds hold  Supine Bridge with Mini Swiss Ball Between Knees - 1-3 x daily - 7 x weekly - 3 sets - 10 reps  Bridge with Hip Abduction and Resistance - 1-3 x daily - 7 x weekly - 3 sets - 10 reps  Hooklying Isometric Hip Flexion - 1 x daily - 7 x weekly - 3 sets - 10 reps  Prone Hip Extension on Table - 1 x daily - 3-5 x weekly - 3 sets - 10-15 reps  Side Stepping with Resistance at Ankles - 1 x daily - 3-5 x weekly - 3 sets - 10 reps  Reverse Lunge on Slider - 1 x daily - 3 x weekly - 2 sets - 15 reps  Mini Squat - 1 x daily - 3 x weekly - 3 sets - 10 reps      GOALS:  Patient stated goal: regular activity  [] Progressing: [] Met: [] Not Met: [] Adjusted    Therapist goals for Patient:   Short Term Goals: To be achieved in: 2 weeks  1. Independent in HEP and progression per patient tolerance, in order to prevent re-injury. [] Progressing: [] Met: [] Not Met: [] Adjusted  2. Patient will have a decrease in pain to facilitate improvement in movement, function, and ADLs as indicated by Functional Deficits. [] Progressing: [] Met: [] Not Met: [] Adjusted    Long Term Goals: To be achieved in: 8 weeks  1. Disability index score of 62.5% or less for the LEFS to assist with reaching prior level of function. [] Progressing: [] Met: [] Not Met: [] Adjusted  2. Patient will demonstrate increased AROM to Hip flexion 90 degrees, knee flexion 130 degrees to allow for proper joint functioning as indicated by patients Functional Deficits. [] Progressing: [] Met: [] Not Met: [] Adjusted  3.  Patient will demonstrate an increase in hip flexion, abduction, quad, hamstring  5/5 Strength to good proximal hip strength and control, within 5lb HHD in LE to allow for proper functional mobility as indicated by patients Functional Deficits. [] Progressing: [] Met: [] Not Met: [] Adjusted  4. Patient will return to Sit to stand 2x10 functional activities without increased symptoms or restriction. [] Progressing: [] Met: [] Not Met: [] Adjusted  5. Patient will return to playing pickleball (half game) without increased symptoms or restriction (patient specific functional goal)    [] Progressing: [] Met: [] Not Met: [] Adjusted     ASSESSMENT:  Session continued to focus on progressive strengthening in [place] and CKC to hips. Presley Villasenor stated he was sore after last session and into the weekend so exercises were progressed minimally. Leg press increased 10#. Squats with red KB press. Added lateral mini lunge only to the left. Will cont to benefit from skilled PT in order to progress functional strength in frontal plane. Plan to add weighted squats in once Maycol increases tolerance to exercises. Return to Play: (if applicable)   []  Stage 1: Intro to Strength   []  Stage 2: Return to Run and Strength   []  Stage 3: Return to Jump and Strength   []  Stage 4: Dynamic Strength and Agility   []  Stage 5: Sport Specific Training     []  Ready to Return to Play, Meets All Above Stages   []  Not Ready for Return to Sports   Comments:            Treatment/Activity Tolerance:  [x] Patient tolerated treatment well [] Patient limited by fatique  [] Patient limited by pain  [] Patient limited by other medical complications  [] Other:     Overall Progression Towards Functional goals/ Treatment Progress Update:  [] Patient is progressing as expected towards functional goals listed. [] Progression is slowed due to complexities/Impairments listed. [] Progression has been slowed due to co-morbidities.   [x] Plan just implemented, too soon to assess goals progression <30days   [] Goals require adjustment due to lack of progress  [] Patient is not progressing as expected and requires additional follow up with physician  [] Other    Prognosis for POC: [x] Good [] Fair  [] Poor    Patient requires continued skilled intervention: [x] Yes  [] No        PLAN: See eval  [] Continue per plan of care [] Alter current plan (see comments)  [x] Plan of care initiated [] Hold pending MD visit [] Discharge    Electronically signed by: Justyn Quinn PT DPT, MS    Note: If patient does not return for scheduled/recommended follow up visits, this note will serve as a discharge from care along with the most recent update on progress.

## 2023-03-01 ENCOUNTER — HOSPITAL ENCOUNTER (OUTPATIENT)
Dept: PHYSICAL THERAPY | Age: 78
Setting detail: THERAPIES SERIES
Discharge: HOME | End: 2023-03-01
Payer: MEDICARE

## 2023-03-01 PROCEDURE — 97530 THERAPEUTIC ACTIVITIES: CPT

## 2023-03-01 PROCEDURE — 97016 VASOPNEUMATIC DEVICE THERAPY: CPT

## 2023-03-01 PROCEDURE — 97110 THERAPEUTIC EXERCISES: CPT

## 2023-03-01 NOTE — FLOWSHEET NOTE
[place] [address] [facility] [address]  Phone: [phone] Fax: [phone]    Physical Therapy Treatment Note/ Progress Report:       Date:  2023    Patient Name:  Froylan Louis    :  [birth year]  MRN: [id number]  Restrictions/Precautions:    Medical/Treatment Diagnosis Information:  Diagnosis: G74.218- S. P left hip replacement       ICD-10-CM    1. Hip pain, left  M25.552       2. Decreased strength of lower extremity  R29.898       3. Decreased range of left hip movement  M25.652           Insurance/Certification information:  PT Insurance Information: Medicare/ AARP  Physician Information:  Machelle Ambriz*  Plan of care signed (Y/N):     Date of Patient follow up with Physician:      Progress Report: []  Yes  [x]  No     Date Range for reporting period:  Beginnin2023  Ending:     Progress report due (10 Rx/or 30 days whichever is less):      Recertification due (POC duration/ or 90 days whichever is less): 2023     Visit # Insurance Allowable Auth Needed   8 MN []Yes    [x]No     Latex Allergy:  [x]NO      []YES  Preferred Language for Healthcare:   [x]English       []other:  Functional Scale: LEFS 30/80, 62.5% dysfunction  Date assessed:2023    Pain level:  3/10     SUBJECTIVE:  Erna Bazzi reported improved tolerance to last session. No soreness today. Would like to progress strengthening. No cane this date.     OBJECTIVE: See eval  Observation:   Test measurements:      RESTRICTIONS/PRECAUTIONS: Left CATRINA 2023    Exercises/Interventions:     Therapeutic Ex (09660)   Min: Sets/sec Reps Notes/CUES   Retro Stepper/BIKE 5'           Alter G      BFR      Sportcord March      3 way SLR      SAQ      Clam ABD      Hip Ext Lester Economy      BOSU fwd/side lunge      BOSU squat            Cybex HS curl      TKE      Quad set with SLR    Standing hamstring curl    Glute side walks 3 15' Yellow thick   RDL Slide HS eccentrics      Step ups/ecc step down      Swissball wall rolls- in SLS- hip drive      Standing 3 way Leg Press Full Range Ecc 2 15 120#   TKE       BOSU Lunge Stomp 10\" 10    SAQ    Manual Intervention (99349)  Min:      Knee mobs/PROM      Tib/Fem Mobs      Patella Mobs      Ankle mobs         Hip   ROM  4' Within restrictions (Log Roll, abd) Flexion   NMR re-education (81052)  Min:   CUES NEEDED   Stride Stance c rebounder 1 20 BL         Stride Stance c rebounder front on airex 1 20 BL   Mongolian/Biofeedback 10/10      BFR      G. Med activation      Hip Ext full ROM/ G. Activation      Bosu Bal and Prop- G Med      Single leg stance/Balance/Prop      Bosu Retro G. Med act      Prone Hip froggers- sliders/elevated      Hip flexor isometric    Hip Add iso c bridge Hip Add Isometric Hip Abd iso c bridge Hip Abd Isometric Therapeutic Activity (21818)  Min:      Ladders      Plyos      Lateral lunge left BOSU 2/3\" 15    Sliders Lateral/posterior Squats KB press 3 15  GKB to chair         Step ups 8\" Lat 2 10 Cues posterior weight shift. Ea/  TKE   Posterior Step down ecc 8\" 2 10 TKE   TRX Sit to Stand    Walking SPC    Walking no SPC    Stop overs c and c/out cane        Therapeutic Exercise and NMR EXR  [x] (64680) Provided verbal/tactile cueing for activities related to strengthening, flexibility, endurance, ROM for improvements in LE, proximal hip, and core control with self care, mobility, lifting, ambulation. [x] (46119) Provided verbal/tactile cueing for activities related to improving balance, coordination, kinesthetic sense, posture, motor skill, proprioception  to assist with LE, proximal hip, and core control in self care, mobility, lifting, ambulation and eccentric single leg control.      NMR and Therapeutic Activities:    [x] (12073 or 46737) Provided verbal/tactile cueing for activities related to improving balance, coordination, kinesthetic sense, posture, motor skill, proprioception and motor activation to allow for proper function of core, proximal hip and LE with self care and ADLs and functional mobility.   [] (26128) Gait Re-education- Provided training and instruction to the patient for proper LE, core and proximal hip recruitment and positioning and eccentric body weight control with ambulation re-education including up and down stairs     Home Exercise Program:    [x] (34866) Reviewed/Progressed HEP activities related to strengthening, flexibility, endurance, ROM of core, proximal hip and LE for functional self-care, mobility, lifting and ambulation/stair navigation   [] (65151)Reviewed/Progressed HEP activities related to improving balance, coordination, kinesthetic sense, posture, motor skill, proprioception of core, proximal hip and LE for self care, mobility, lifting, and ambulation/stair navigation      Manual Treatments:  PROM / STM / Oscillations-Mobs:  G-I, II, III, IV (PA's, Inf., Post.)  [] (21063) Provided manual therapy to mobilize LE, proximal hip and/or LS spine soft tissue/joints for the purpose of modulating pain, promoting relaxation,  increasing ROM, reducing/eliminating soft tissue swelling/inflammation/restriction, improving soft tissue extensibility and allowing for proper ROM for normal function with self care, mobility, lifting and ambulation. Modalities:     [x] GAME READY (VASO)- for significant edema, swelling, pain control. Charges:  Timed Code Treatment Minutes: 45   Total Treatment Minutes: 60      [] EVAL (LOW) 01370 (typically 20 minutes face-to-face)  [] EVAL (MOD) 87283 (typically 30 minutes face-to-face)  [] EVAL (HIGH) 50633 (typically 45 minutes face-to-face)  [] RE-EVAL     [x] RH(35887) x   1  [] DRY NEEDLE 1 OR 2 MUSCLES  [] NMR (58091) x     [] DRY NEEDLE 3+ MUSCLES  [] Manual (31754) x       [x] TA (84618) x  2  [] Mech Traction (97218)  [] ES(attended) (13433)     [] ES (un) (60348):   [x] VASO (87875) Min  [] Other:     If [facility] Please Indicate Time In/Out  CPT Code Time in Time out                                   Access Code: [email or web address]  URL: [email or web address]  Date: 02/13/2023  Prepared by: Guillaume Shallow    Exercises  Hooklying Isometric Clamshell - 1-3 x daily - 7 x weekly - 1 sets - 10 reps - 10 seconds hold  Supine Hip Adduction Isometric with Ball - 1-3 x daily - 7 x weekly - 1 sets - 10 reps - 10 seconds hold  Supine Bridge with Mini Swiss Ball Between Knees - 1-3 x daily - 7 x weekly - 3 sets - 10 reps  Bridge with Hip Abduction and Resistance - 1-3 x daily - 7 x weekly - 3 sets - 10 reps  Hooklying Isometric Hip Flexion - 1 x daily - 7 x weekly - 3 sets - 10 reps  Prone Hip Extension on Table - 1 x daily - 3-5 x weekly - 3 sets - 10-15 reps  Side Stepping with Resistance at Ankles - 1 x daily - 3-5 x weekly - 3 sets - 10 reps  Reverse Lunge on Slider - 1 x daily - 3 x weekly - 2 sets - 15 reps  Mini Squat - 1 x daily - 3 x weekly - 3 sets - 10 reps      GOALS:  Patient stated goal: regular activity  [] Progressing: [] Met: [] Not Met: [] Adjusted    Therapist goals for Patient:   Short Term Goals: To be achieved in: 2 weeks  1. Independent in HEP and progression per patient tolerance, in order to prevent re-injury. [] Progressing: [] Met: [] Not Met: [] Adjusted  2. Patient will have a decrease in pain to facilitate improvement in movement, function, and ADLs as indicated by Functional Deficits. [] Progressing: [] Met: [] Not Met: [] Adjusted    Long Term Goals: To be achieved in: 8 weeks  1. Disability index score of 62.5% or less for the LEFS to assist with reaching prior level of function. [] Progressing: [] Met: [] Not Met: [] Adjusted  2. Patient will demonstrate increased AROM to Hip flexion 90 degrees, knee flexion 130 degrees to allow for proper joint functioning as indicated by patients Functional Deficits. [] Progressing: [] Met: [] Not Met: [] Adjusted  3.  Patient will demonstrate an increase in hip flexion, abduction, quad, hamstring  5/5 Strength to good proximal hip strength and control, within 5lb HHD in LE to allow for proper functional mobility as indicated by patients Functional Deficits. [] Progressing: [] Met: [] Not Met: [] Adjusted  4. Patient will return to Sit to stand 2x10 functional activities without increased symptoms or restriction. [] Progressing: [] Met: [] Not Met: [] Adjusted  5. Patient will return to playing pickleball (half game) without increased symptoms or restriction (patient specific functional goal)    [] Progressing: [] Met: [] Not Met: [] Adjusted     ASSESSMENT:  Good progressions. Veda Artist has been adapting to training load well. Progressed CKC stress to hip this date. Added Robert Hawley with hold 3\" this date. Good response to this intervention. Have continued to limit range into hip abduction and extension. Will start to implement contralateral hip movements to facilitate CKC stabilization in hip abd and extension next week. PN next date. Return to Play: (if applicable)   []  Stage 1: Intro to Strength   []  Stage 2: Return to Run and Strength   []  Stage 3: Return to Jump and Strength   []  Stage 4: Dynamic Strength and Agility   []  Stage 5: Sport Specific Training     []  Ready to Return to Play, Meets All Above Stages   []  Not Ready for Return to Sports   Comments:            Treatment/Activity Tolerance:  [x] Patient tolerated treatment well [] Patient limited by fatique  [] Patient limited by pain  [] Patient limited by other medical complications  [] Other:     Overall Progression Towards Functional goals/ Treatment Progress Update:  [] Patient is progressing as expected towards functional goals listed. [] Progression is slowed due to complexities/Impairments listed. [] Progression has been slowed due to co-morbidities.   [x] Plan just implemented, too soon to assess goals progression <30days   [] Goals require adjustment due to lack of progress  [] Patient is not progressing as expected and requires additional follow up with physician  [] Other    Prognosis for POC: [x] Good [] Fair  [] Poor    Patient requires continued skilled intervention: [x] Yes  [] No        PLAN: See eval  [] Continue per plan of care [] Alter current plan (see comments)  [x] Plan of care initiated [] Hold pending MD visit [] Discharge    Electronically signed by: Paul Landers PT DPT, MS    Note: If patient does not return for scheduled/recommended follow up visits, this note will serve as a discharge from care along with the most recent update on progress.

## 2023-03-08 ENCOUNTER — HOSPITAL ENCOUNTER (OUTPATIENT)
Dept: PHYSICAL THERAPY | Age: 78
Setting detail: THERAPIES SERIES
Discharge: HOME | End: 2023-03-08
Payer: MEDICARE

## 2023-03-08 PROCEDURE — 97110 THERAPEUTIC EXERCISES: CPT

## 2023-03-08 PROCEDURE — 97530 THERAPEUTIC ACTIVITIES: CPT

## 2023-03-08 NOTE — FLOWSHEET NOTE
[place] [address] [facility]  Phone: [phone] Fax: [phone]    Physical Therapy Treatment Note/ Progress Report:       Date:  2023    Patient Name:  Heron Carter    :    MRN: [id number]  Restrictions/Precautions:    Medical/Treatment Diagnosis Information:  Diagnosis: O89.407- S. P left hip replacement       ICD-10-CM    1. Hip pain, left  M25.552       2. Decreased strength of lower extremity  R29.898       3. Decreased range of left hip movement  M25.652           Insurance/Certification information:  PT Insurance Information: Medicare/ AARP  Physician Information:  Pradeep Pale*  Plan of care signed (Y/N):     Date of Patient follow up with Physician:      Progress Report: [x]  Yes  []  No     Date Range for reporting period:  Beginnin2023  Ending:     Progress report due (10 Rx/or 30 days whichever is less):      Recertification due (POC duration/ or 90 days whichever is less): 2023     Visit # Insurance Allowable Auth Needed   9 MN []Yes    [x]No     Latex Allergy:  [x]NO      []YES  Preferred Language for Healthcare:   [x]English       []other:  Functional Scale: LEFS 30/80, 62.5% dysfunction  Date assessed:2023  Functional Scale: LEFS 65/80, 18.75% dysfunction  Date assessed:2023  Pain level:  3/10     SUBJECTIVE:  Tavon Morrison reports roughly 75-80% improvement since his surgery. Tried SLR yesterday and got to 5 before having some irritation. Sees Surgeon on 3/21/2023. He is 6 weeks s/p Left CATRINA.     OBJECTIVE: See eval  Observation:   Test measurements:    MMT   L R  Hip abd 4+ 5  ROM   Hip Flexion  75 65   Knee Flexion    RESTRICTIONS/PRECAUTIONS: Left CATRINA 2023    Exercises/Interventions:     Therapeutic Ex (23779)   Min: Sets/sec Reps Notes/CUES   Retro Stepper/BIKE 5'           Alter G      BFR      Sportcord March      3 way SLR      SAQ      Clam ABD Hip Ext Jayashree Stefani      BOSU fwd/side lunge      BOSU squat            Cybex HS curl      TKE      Prone Quad Stretch 30\" 3    Quad set with SLR    Standing hamstring curl    Glute side walks 3 15' Yellow thick   RDL            Slide HS eccentrics      Step ups/ecc step down      Swissball wall rolls- in SLS- hip drive      Standing 3 way Leg Press Full Range Ecc 3 15 120#   TKE       BOSU Lunge Stomp 10\" 10 Bilateral   SAQ    Manual Intervention (35864)  Min:      Knee mobs/PROM      Tib/Fem Mobs      Patella Mobs      Ankle mobs         NMR re-education (68132)  Min:   CUES NEEDED   Stride Stance c rebounder 1 20 BL         Stride Stance c rebounder front on airex 1 20 BL   Luxembourger/Biofeedback 10/10      BFR      G. Med activation      Hip Ext full ROM/ G. Activation      Bosu Bal and Prop- G Med      Single leg stance/Balance/Prop      Bosu Retro G. Med act      Prone Hip froggers- sliders/elevated      Hip flexor isometric    Hip Add iso c bridge Hip Add Isometric Hip Abd iso c bridge Hip Abd Isometric Therapeutic Activity (77330)  Min:      Assess: ROM, Goals, MMT, LEFS 10'     Ladders      Plyos      Lateral lunge left BOSU 2/3\" 15 BL   Sliders Lateral/posterior 2 10ea Bilateral   Squats KB press 3 15  GKB to chair         Step ups 8\" Lat 2 10 Cues posterior weight shift. Ea/  TKE   Posterior Step down ecc 8\" 2 10 TKE   TRX Sit to Stand    Walking SPC    Walking no SPC    Stop overs c and c/out cane        Therapeutic Exercise and NMR EXR  [x] (05254) Provided verbal/tactile cueing for activities related to strengthening, flexibility, endurance, ROM for improvements in LE, proximal hip, and core control with self care, mobility, lifting, ambulation.   [x] (51825) Provided verbal/tactile cueing for activities related to improving balance, coordination, kinesthetic sense, posture, motor skill, proprioception  to assist with LE, proximal hip, and core control in self care, mobility, lifting, ambulation and eccentric single leg control.     NMR and Therapeutic Activities:    [x] (52406 or 11826) Provided verbal/tactile cueing for activities related to improving balance, coordination, kinesthetic sense, posture, motor skill, proprioception and motor activation to allow for proper function of core, proximal hip and LE with self care and ADLs and functional mobility.   [] (71657) Gait Re-education- Provided training and instruction to the patient for proper LE, core and proximal hip recruitment and positioning and eccentric body weight control with ambulation re-education including up and down stairs     Home Exercise Program:    [x] (27200) Reviewed/Progressed HEP activities related to strengthening, flexibility, endurance, ROM of core, proximal hip and LE for functional self-care, mobility, lifting and ambulation/stair navigation   [] (48567)Reviewed/Progressed HEP activities related to improving balance, coordination, kinesthetic sense, posture, motor skill, proprioception of core, proximal hip and LE for self care, mobility, lifting, and ambulation/stair navigation      Manual Treatments:  PROM / STM / Oscillations-Mobs:  G-I, II, III, IV (PA's, Inf., Post.)  [] (29194) Provided manual therapy to mobilize LE, proximal hip and/or LS spine soft tissue/joints for the purpose of modulating pain, promoting relaxation,  increasing ROM, reducing/eliminating soft tissue swelling/inflammation/restriction, improving soft tissue extensibility and allowing for proper ROM for normal function with self care, mobility, lifting and ambulation.     Modalities:     [x] GAME READY (VASO)- for significant edema, swelling, pain control.     Charges:  Timed Code Treatment Minutes: 60   Total Treatment Minutes: 60      [] EVAL (LOW) 24645 (typically 20 minutes face-to-face)  [] EVAL (MOD) 98493 (typically 30 minutes face-to-face)  [] EVAL (HIGH) 23042 (typically 45 minutes face-to-face)  [] RE-EVAL     [x] TE(00425) x   2  [] DRY NEEDLE 1  OR 2 MUSCLES  [] NMR (21718) x     [] DRY NEEDLE 3+ MUSCLES  [] Manual (10852) x       [x] TA (58354) x  2  [] Mech Traction (52455)  [] ES(attended) (90976)     [] ES (un) (45464):   [] VASO (94601) Min  [] Other:    If BWC Please Indicate Time In/Out  CPT Code Time in Time out                                   Access Code: [email or web address]  URL: [email or web address]  Date: 02/13/2023  Prepared by: Benito Cordova    Exercises  Hooklying Isometric Clamshell - 1-3 x daily - 7 x weekly - 1 sets - 10 reps - 10 seconds hold  Supine Hip Adduction Isometric with Ball - 1-3 x daily - 7 x weekly - 1 sets - 10 reps - 10 seconds hold  Supine Bridge with Mini Swiss Ball Between Knees - 1-3 x daily - 7 x weekly - 3 sets - 10 reps  Bridge with Hip Abduction and Resistance - 1-3 x daily - 7 x weekly - 3 sets - 10 reps  Hooklying Isometric Hip Flexion - 1 x daily - 7 x weekly - 3 sets - 10 reps  Prone Hip Extension on Table - 1 x daily - 3-5 x weekly - 3 sets - 10-15 reps  Side Stepping with Resistance at Ankles - 1 x daily - 3-5 x weekly - 3 sets - 10 reps  Reverse Lunge on Slider - 1 x daily - 3 x weekly - 2 sets - 15 reps  Mini Squat - 1 x daily - 3 x weekly - 3 sets - 10 reps      GOALS:  Patient stated goal: regular activity  [x] Progressing: [] Met: [] Not Met: [] Adjusted    Therapist goals for Patient:   Short Term Goals: To be achieved in: 2 weeks  1. Independent in HEP and progression per patient tolerance, in order to prevent re-injury. [] Progressing: [x] Met: [] Not Met: [] Adjusted  2. Patient will have a decrease in pain to facilitate improvement in movement, function, and ADLs as indicated by Functional Deficits. [] Progressing: [x] Met: [] Not Met: [] Adjusted    Long Term Goals: To be achieved in: 8 weeks  1. Disability index score of 10% or less for the LEFS to assist with reaching prior level of function. [x] Progressing: [] Met: [] Not Met: [] Adjusted  2.  Patient will demonstrate increased AROM to Hip flexion 90 degrees, knee flexion 130 degrees to allow for proper joint functioning as indicated by patients Functional Deficits. [x] Progressing: [] Met: [] Not Met: [] Adjusted  3. Patient will demonstrate an increase in hip flexion, abduction, quad, hamstring  5/5 Strength to good proximal hip strength and control, within 5lb HHD in LE to allow for proper functional mobility as indicated by patients Functional Deficits. [x] Progressing: [] Met: [] Not Met: [] Adjusted  4. Patient will return to Sit to stand 2x10 functional activities without increased symptoms or restriction. [] Progressing: [x] Met: [] Not Met: [] Adjusted  5. Patient will return to playing pickleball (half game) without increased symptoms or restriction (patient specific functional goal)    [x] Progressing: [] Met: [] Not Met: [] Adjusted     ASSESSMENT:  PN this date. Viry Brian with improvements to hip strength and ROM to SLR. Still limited bilaterally but left is better than right. Has met 2/2 STG and 1/6 LTG at this time. Session added bilateral movements with left leg into passive abd and extension more this date. 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 He will continue to benefit from skilled PT interventions to progress to PLOF through CKC strengthening In all planes of motion.    Return to Play: (if applicable)   []  Stage 1: Intro to Strength   []  Stage 2: Return to Run and Strength   []  Stage 3: Return to Jump and Strength   []  Stage 4: Dynamic Strength and Agility   []  Stage 5: Sport Specific Training     []  Ready to Return to Play, Meets All Above Stages   []  Not Ready for Return to Sports   Comments:            Treatment/Activity Tolerance:  [x] Patient tolerated treatment well [] Patient limited by fatique  [] Patient limited by pain  [] Patient limited by other medical complications  [] Other: Overall Progression Towards Functional goals/ Treatment Progress Update:  [x] Patient is progressing as expected towards functional goals listed. [] Progression is slowed due to complexities/Impairments listed. [] Progression has been slowed due to co-morbidities. [] Plan just implemented, too soon to assess goals progression <30days   [] Goals require adjustment due to lack of progress  [] Patient is not progressing as expected and requires additional follow up with physician  [] Other    Prognosis for POC: [x] Good [] Fair  [] Poor    Patient requires continued skilled intervention: [x] Yes  [] No        PLAN: Progress To plyometric activities (lunges)  [] Continue per plan of care [] Alter current plan (see comments)  [x] Plan of care initiated [] Hold pending MD visit [] Discharge    Electronically signed by: Elroy Fatima PT DPT, MS    Note: If patient does not return for scheduled/recommended follow up visits, this note will serve as a discharge from care along with the most recent update on progress.

## 2023-03-10 ENCOUNTER — HOSPITAL ENCOUNTER (OUTPATIENT)
Dept: PHYSICAL THERAPY | Age: 78
Setting detail: THERAPIES SERIES
Discharge: HOME | End: 2023-03-10
Payer: MEDICARE

## 2023-03-10 PROCEDURE — 97530 THERAPEUTIC ACTIVITIES: CPT

## 2023-03-10 PROCEDURE — 97110 THERAPEUTIC EXERCISES: CPT

## 2023-03-10 NOTE — FLOWSHEET NOTE
[place] [address] [facility]  Phone: [phone] Fax: [phone]    Physical Therapy Treatment Note/ Progress Report:       Date:  03/10/2023    Patient Name:  Memo Baer    :  [birth year]  MRN: [id number]  Restrictions/Precautions:    Medical/Treatment Diagnosis Information:  Diagnosis: D54.749- S. P left hip replacement       ICD-10-CM    1. Hip pain, left  M25.552       2. Decreased strength of lower extremity  R29.898       3. Decreased range of left hip movement  M25.652           Insurance/Certification information:  PT Insurance Information: Medicare/ AARP  Physician Information:  Madeline Seat*  Plan of care signed (Y/N):     Date of Patient follow up with Physician:      Progress Report: [x]  Yes  []  No     Date Range for reporting period:  Beginnin2023  Ending:     Progress report due (10 Rx/or 30 days whichever is less): 5125     Recertification due (POC duration/ or 90 days whichever is less): 2023     Visit # Insurance Allowable Auth Needed   10 MN []Yes    [x]No     Latex Allergy:  [x]NO      []YES  Preferred Language for Healthcare:   [x]English       []other:  Functional Scale: LEFS 30/80, 62.5% dysfunction  Date assessed:2023  Functional Scale: LEFS 65/80, 18.75% dysfunction  Date assessed:2023  Pain level:  3/10     SUBJECTIVE:  Brought pickleball paddles this date to include functional movements and balance.      OBJECTIVE: See eval  Observation:   Test measurements:    MMT   L R  Hip abd 4+ 5  ROM   Hip Flexion  75 65   Knee Flexion    RESTRICTIONS/PRECAUTIONS: Left CATRINA 2023    Exercises/Interventions:     Therapeutic Ex (53650)   Min: Sets/sec Reps Notes/CUES   Retro Stepper/BIKE 5'           Alter G      BFR      Sportcord March      3 way SLR      SAQ      Clam ABD      Hip Ext [place]      BOSU fwd/side lunge      BOSU squat            Cybex HS curl TKE      Prone Quad Stretch    Quad set with SLR    Standing hamstring curl    Glute side walks 3 15' Yellow thick   RDL            Slide HS eccentrics      Step ups/ecc step down      Swissball wall rolls- in SLS- hip drive      Standing 3 way Leg Press Full Range Ecc 3 15 125# c TB cue   TKE       BOSU Lunge Stomp SAQ    Manual Intervention (80882)  Min:      Knee mobs/PROM      Tib/Fem Mobs      Patella Mobs      Ankle mobs         NMR re-education (79236)  Min:   CUES NEEDED   Stride Stance c rebounder 1 20 BL         Stride Stance c rebounder front on airex 1 20 BL   [place]/Biofeedback 10/10      BFR      G. Med activation      Hip Ext full ROM/ G. Activation      Bosu Bal and Prop- G Med      Single leg stance/Balance/Prop      Bosu Retro G. Med act      Prone Hip froggers- sliders/elevated      Hip flexor isometric    Hip Add iso c bridge Hip Add Isometric Hip Abd iso c bridge Hip Abd Isometric Therapeutic Activity (71770)  Min:          Ladders      Plyos      Pickleball lunge iso c volley 2 20 BL   Side stepping c rebound 2 20\"    Reactive lunge c volley 2 20    Lateral lunge left BOSU 2/3\" 15 BL   Sliders Lateral/posterior 2 10ea Bilateral   Squats KB press 3 15  GKB to chair. Adduction iso,         Step ups 8\" Lat 2 10 Cues posterior weight shift. Ea/  TKE   Posterior Step down ecc 8\" 2 10 TKE   TRX Sit to Stand    Walking SPC    Walking no SPC    Stop overs c and c/out cane        Therapeutic Exercise and NMR EXR  [x] (65636) Provided verbal/tactile cueing for activities related to strengthening, flexibility, endurance, ROM for improvements in LE, proximal hip, and core control with self care, mobility, lifting, ambulation.   [x] (50724) Provided verbal/tactile cueing for activities related to improving balance, coordination, kinesthetic sense, posture, motor skill, proprioception  to assist with LE, proximal hip, and core control in self care, mobility, lifting, ambulation and eccentric single leg control. NMR and Therapeutic Activities:    [x] (18112 or 98664) Provided verbal/tactile cueing for activities related to improving balance, coordination, kinesthetic sense, posture, motor skill, proprioception and motor activation to allow for proper function of core, proximal hip and LE with self care and ADLs and functional mobility.   [] (83183) Gait Re-education- Provided training and instruction to the patient for proper LE, core and proximal hip recruitment and positioning and eccentric body weight control with ambulation re-education including up and down stairs     Home Exercise Program:    [x] (69588) Reviewed/Progressed HEP activities related to strengthening, flexibility, endurance, ROM of core, proximal hip and LE for functional self-care, mobility, lifting and ambulation/stair navigation   [] (28873)Reviewed/Progressed HEP activities related to improving balance, coordination, kinesthetic sense, posture, motor skill, proprioception of core, proximal hip and LE for self care, mobility, lifting, and ambulation/stair navigation      Manual Treatments:  PROM / STM / Oscillations-Mobs:  G-I, II, III, IV (PA's, Inf., Post.)  [] (92011) Provided manual therapy to mobilize LE, proximal hip and/or LS spine soft tissue/joints for the purpose of modulating pain, promoting relaxation,  increasing ROM, reducing/eliminating soft tissue swelling/inflammation/restriction, improving soft tissue extensibility and allowing for proper ROM for normal function with self care, mobility, lifting and ambulation. Modalities:     [x] GAME READY (VASO)- for significant edema, swelling, pain control.      Charges:  Timed Code Treatment Minutes: 48   Total Treatment Minutes: 48      [] EVAL (LOW) 69929 (typically 20 minutes face-to-face)  [] EVAL (MOD) 93654 (typically 30 minutes face-to-face)  [] EVAL (HIGH) 14683 (typically 45 minutes face-to-face)  [] RE-EVAL     [x] VW(34867) x   1  [] DRY NEEDLE 1 OR 2 MUSCLES  [] NMR (31191) x     [] DRY NEEDLE 3+ MUSCLES  [] Manual (47226) x       [x] TA (23845) x  2  [] Mech Traction (50871)  [] ES(attended) (78355)     [] ES (un) (94465):   [] VASO (25308) Min  [] Other:    If BWC Please Indicate Time In/Out  CPT Code Time in Time out                                   Access Code: [email or web address]  URL: [employer]/  Date: 02/13/2023  Prepared by: Yifan Common    Exercises  Hooklying Isometric Clamshell - 1-3 x daily - 7 x weekly - 1 sets - 10 reps - 10 seconds hold  Supine Hip Adduction Isometric with Ball - 1-3 x daily - 7 x weekly - 1 sets - 10 reps - 10 seconds hold  Supine Bridge with Mini Swiss Ball Between Knees - 1-3 x daily - 7 x weekly - 3 sets - 10 reps  Bridge with Hip Abduction and Resistance - 1-3 x daily - 7 x weekly - 3 sets - 10 reps  Hooklying Isometric Hip Flexion - 1 x daily - 7 x weekly - 3 sets - 10 reps  Prone Hip Extension on Table - 1 x daily - 3-5 x weekly - 3 sets - 10-15 reps  Side Stepping with Resistance at Ankles - 1 x daily - 3-5 x weekly - 3 sets - 10 reps  Reverse Lunge on Slider - 1 x daily - 3 x weekly - 2 sets - 15 reps  Mini Squat - 1 x daily - 3 x weekly - 3 sets - 10 reps      GOALS:  Patient stated goal: regular activity  [x] Progressing: [] Met: [] Not Met: [] Adjusted    Therapist goals for Patient:   Short Term Goals: To be achieved in: 2 weeks  1. Independent in HEP and progression per patient tolerance, in order to prevent re-injury. [] Progressing: [x] Met: [] Not Met: [] Adjusted  2. Patient will have a decrease in pain to facilitate improvement in movement, function, and ADLs as indicated by Functional Deficits. [] Progressing: [x] Met: [] Not Met: [] Adjusted    Long Term Goals: To be achieved in: 8 weeks  1. Disability index score of 10% or less for the LEFS to assist with reaching prior level of function. [x] Progressing: [] Met: [] Not Met: [] Adjusted  2.  Patient will demonstrate increased AROM to Hip flexion 90 degrees, knee flexion 130 degrees to allow for proper joint functioning as indicated by patients Functional Deficits. [x] Progressing: [] Met: [] Not Met: [] Adjusted  3. Patient will demonstrate an increase in hip flexion, abduction, quad, hamstring  5/5 Strength to good proximal hip strength and control, within 5lb HHD in LE to allow for proper functional mobility as indicated by patients Functional Deficits. [x] Progressing: [] Met: [] Not Met: [] Adjusted  4. Patient will return to Sit to stand 2x10 functional activities without increased symptoms or restriction. [] Progressing: [x] Met: [] Not Met: [] Adjusted  5. Patient will return to playing pickleball (half game) without increased symptoms or restriction (patient specific functional goal)    [x] Progressing: [] Met: [] Not Met: [] Adjusted     ASSESSMENT: Sybil Goodell states that he continues to have a little pain in his groin. He walked more in the last two days and has noticed general soreness from deconditioning. This session continued to include strengthening in CKC with bilateral interventions. Emphasis on minimizing adductor compensation/promoting gluteal bias. Added functional pickleball movements including isometric lunge with volley. Dynamic lateral movements with volley. Reactive volley. All these interventions were tolerated well. Sybil Goodell continues to state confidence in his hip. Will cont to benefit from skilled PT interventions to progress this dynamic strength in order to allow Sybil Goodell to continue with recreational activity to maintain health. Noted small functional ROM deficit with right lunge.     Return to Play: (if applicable)   []  Stage 1: Intro to Strength   []  Stage 2: Return to Run and Strength   []  Stage 3: Return to Jump and Strength   []  Stage 4: Dynamic Strength and Agility   []  Stage 5: Sport Specific Training     []  Ready to Return to Play, Meets All Above Stages   []  Not Ready for Return to Sports   Comments:            Treatment/Activity Tolerance:  [x] Patient tolerated treatment well [] Patient limited by fatique  [] Patient limited by pain  [] Patient limited by other medical complications  [] Other:     Overall Progression Towards Functional goals/ Treatment Progress Update:  [x] Patient is progressing as expected towards functional goals listed. [] Progression is slowed due to complexities/Impairments listed. [] Progression has been slowed due to co-morbidities. [] Plan just implemented, too soon to assess goals progression <30days   [] Goals require adjustment due to lack of progress  [] Patient is not progressing as expected and requires additional follow up with physician  [] Other    Prognosis for POC: [x] Good [] Fair  [] Poor    Patient requires continued skilled intervention: [x] Yes  [] No        PLAN: Progress To plyometric activities (lunges)  [] Continue per plan of care [] Alter current plan (see comments)  [x] Plan of care initiated [] Hold pending MD visit [] Discharge    Electronically signed by: Jose Miguel Toney PT DPT, MS    Note: If patient does not return for scheduled/recommended follow up visits, this note will serve as a discharge from care along with the most recent update on progress.

## 2023-03-13 ENCOUNTER — HOSPITAL ENCOUNTER (OUTPATIENT)
Dept: PHYSICAL THERAPY | Age: 78
Setting detail: THERAPIES SERIES
Discharge: HOME | End: 2023-03-13
Payer: MEDICARE

## 2023-03-13 PROCEDURE — 97110 THERAPEUTIC EXERCISES: CPT

## 2023-03-13 PROCEDURE — 97530 THERAPEUTIC ACTIVITIES: CPT

## 2023-03-13 NOTE — FLOWSHEET NOTE
[place] [address] [place] [address]  Phone: [phone] Fax: [phone]    Physical Therapy Treatment Note/ Progress Report:       Date:  2023    Patient Name:  Oleg Pond    :  [birth year]  MRN: [id number]  Restrictions/Precautions:    Medical/Treatment Diagnosis Information:  Diagnosis: V64.888- S. P left hip replacement       ICD-10-CM    1. Hip pain, left  M25.552       2. Decreased strength of lower extremity  R29.898       3. Decreased range of left hip movement  M25.652           Insurance/Certification information:  PT Insurance Information: Medicare/ AARP  Physician Information:  Mariama Loredo  Plan of care signed (Y/N):     Date of Patient follow up with Physician:      Progress Report: [x]  Yes  []  No     Date Range for reporting period:  Beginnin2023  Ending:     Progress report due (10 Rx/or 30 days whichever is less):      Recertification due (POC duration/ or 90 days whichever is less): 2023     Visit # Insurance Allowable Auth Needed   11 [place] []Yes    [x]No     Latex Allergy:  [x]NO      []YES  Preferred Language for Healthcare:   [x]English       []other:  Functional Scale: LEFS 30/80, 62.5% dysfunction  Date assessed:2023  Functional Scale: LEFS 65/80, 18.75% dysfunction  Date assessed:2023  Pain level:  3/10     SUBJECTIVE:  Brought pickleball paddles this date to include functional movements and balance.      OBJECTIVE: See eval  Observation:   Test measurements:    MMT   L R  Hip abd 4+ 5  ROM   Hip Flexion  75 65   Knee Flexion    RESTRICTIONS/PRECAUTIONS: Left CATRINA 2023    Exercises/Interventions:     Therapeutic Ex (98138)   Min: Sets/sec Reps Notes/CUES   Retro Stepper/BIKE 5'           Alter G      BFR      Sportcord March      3 way SLR      SAQ      Clam ABD      Hip Ext Jenelle Basques      BOSU fwd/side lunge      BOSU squat            Cybex HS curl TKE      Prone Quad Stretch    Quad set with SLR    Standing hamstring curl    Glute side walks/monster walks 3 15' Yellow thick   RDL            Slide HS eccentrics      Step ups/ecc step down      Swissball wall rolls- in SLS- hip drive      Standing 3 way Leg Press Full Range Ecc 3 10- 15 125# c TB cue   TKE       BOSU Lunge Stomp with KB S2S 2 10 Bilateral, 10#   SAQ    Manual Intervention (30402)  Min:      Knee mobs/PROM      Tib/Fem Mobs      Patella Mobs      Ankle mobs         NMR re-education (31375)  Min:   CUES NEEDED   Stride Stance c rebounder 1 20 BL         Stride Stance c rebounder front on airex 1 20 BL   Azerbaijani/Biofeedback 10/10      BFR      G. Med activation      Hip Ext full ROM/ G. Activation      Bosu Bal and Prop- G Med      Single leg stance/Balance/Prop      Bosu Retro G. Med act      Prone Hip froggers- sliders/elevated      Hip flexor isometric    Hip Add iso c bridge Hip Add Isometric Hip Abd iso c bridge Hip Abd Isometric Therapeutic Activity (36567)  Min:          Ladders      Plyos      Posterior lunge 2 15 BL   Lateral Lunge 2 15 BL   Pickleball lunge iso c volley 2 20 BL   Side stepping c rebound 2 20\"    Reactive lunge c volley 2 20    Lateral lunge left BOSU Sliders Lateral/posterior 2 10ea Bilateral   Squats KB press 20# lift to shelf (Squat)      Step ups 8\" Lat 2 10 Knee Drive   Posterior Step down ecc 8\" 2 10    TRX Sit to Stand    Walking SPC    Walking no SPC    Stop overs c and c/out cane        Therapeutic Exercise and NMR EXR  [x] (25944) Provided verbal/tactile cueing for activities related to strengthening, flexibility, endurance, ROM for improvements in LE, proximal hip, and core control with self care, mobility, lifting, ambulation.   [x] (72143) Provided verbal/tactile cueing for activities related to improving balance, coordination, kinesthetic sense, posture, motor skill, proprioception  to assist with LE, proximal hip, and core control in self care, mobility, lifting, ambulation and eccentric single leg control. NMR and Therapeutic Activities:    [x] (01645 or 97290) Provided verbal/tactile cueing for activities related to improving balance, coordination, kinesthetic sense, posture, motor skill, proprioception and motor activation to allow for proper function of core, proximal hip and LE with self care and ADLs and functional mobility.   [] (95355) Gait Re-education- Provided training and instruction to the patient for proper LE, core and proximal hip recruitment and positioning and eccentric body weight control with ambulation re-education including up and down stairs     Home Exercise Program:    [x] (56161) Reviewed/Progressed HEP activities related to strengthening, flexibility, endurance, ROM of core, proximal hip and LE for functional self-care, mobility, lifting and ambulation/stair navigation   [] (21573)Reviewed/Progressed HEP activities related to improving balance, coordination, kinesthetic sense, posture, motor skill, proprioception of core, proximal hip and LE for self care, mobility, lifting, and ambulation/stair navigation      Manual Treatments:  PROM / STM / Oscillations-Mobs:  G-I, II, III, IV (PA's, Inf., Post.)  [] (40549) Provided manual therapy to mobilize LE, proximal hip and/or LS spine soft tissue/joints for the purpose of modulating pain, promoting relaxation,  increasing ROM, reducing/eliminating soft tissue swelling/inflammation/restriction, improving soft tissue extensibility and allowing for proper ROM for normal function with self care, mobility, lifting and ambulation. Modalities:     [x] GAME READY (VASO)- for significant edema, swelling, pain control.      Charges:  Timed Code Treatment Minutes: 57   Total Treatment Minutes: 57      [] EVAL (LOW) 34751 (typically 20 minutes face-to-face)  [] EVAL (MOD) 53938 (typically 30 minutes face-to-face)  [] EVAL (HIGH) 423 0817 (typically 45 minutes face-to-face)  [] RE-EVAL     [x] YW(20321) x   1  [] DRY NEEDLE 1 OR 2 MUSCLES  [] NMR (05942) x     [] DRY NEEDLE 3+ MUSCLES  [] Manual (71517) x       [x] TA (80043) x  3  [] Mech Traction (66353)  [] ES(attended) (95565)     [] ES (un) (63135):   [] VASO (75508) Min  [] Other:    If BW Please Indicate Time In/Out  CPT Code Time in Time out                                   Access Code: [email or web address]  URL: [email or web address]  Date: 02/13/2023  Prepared by: Gi Loza    Exercises  Hooklying Isometric Clamshell - 1-3 x daily - 7 x weekly - 1 sets - 10 reps - 10 seconds hold  Supine Hip Adduction Isometric with Ball - 1-3 x daily - 7 x weekly - 1 sets - 10 reps - 10 seconds hold  Supine Bridge with Mini Swiss Ball Between Knees - 1-3 x daily - 7 x weekly - 3 sets - 10 reps  Bridge with Hip Abduction and Resistance - 1-3 x daily - 7 x weekly - 3 sets - 10 reps  Hooklying Isometric Hip Flexion - 1 x daily - 7 x weekly - 3 sets - 10 reps  Prone Hip Extension on Table - 1 x daily - 3-5 x weekly - 3 sets - 10-15 reps  Side Stepping with Resistance at Ankles - 1 x daily - 3-5 x weekly - 3 sets - 10 reps  Reverse Lunge on Slider - 1 x daily - 3 x weekly - 2 sets - 15 reps  Mini Squat - 1 x daily - 3 x weekly - 3 sets - 10 reps      GOALS:  Patient stated goal: regular activity  [x] Progressing: [] Met: [] Not Met: [] Adjusted    Therapist goals for Patient:   Short Term Goals: To be achieved in: 2 weeks  1. Independent in HEP and progression per patient tolerance, in order to prevent re-injury. [] Progressing: [x] Met: [] Not Met: [] Adjusted  2. Patient will have a decrease in pain to facilitate improvement in movement, function, and ADLs as indicated by Functional Deficits. [] Progressing: [x] Met: [] Not Met: [] Adjusted    Long Term Goals: To be achieved in: 8 weeks  1. Disability index score of 10% or less for the LEFS to assist with reaching prior level of function. [x] Progressing: [] Met: [] Not Met: [] Adjusted  2.  Patient will demonstrate increased AROM to Hip flexion 90 degrees, knee flexion 130 degrees to allow for proper joint functioning as indicated by patients Functional Deficits. [x] Progressing: [] Met: [] Not Met: [] Adjusted  3. Patient will demonstrate an increase in hip flexion, abduction, quad, hamstring  5/5 Strength to good proximal hip strength and control, within 5lb HHD in LE to allow for proper functional mobility as indicated by patients Functional Deficits. [x] Progressing: [] Met: [] Not Met: [] Adjusted  4. Patient will return to Sit to stand 2x10 functional activities without increased symptoms or restriction. [] Progressing: [x] Met: [] Not Met: [] Adjusted  5. Patient will return to playing pickleball (half game) without increased symptoms or restriction (patient specific functional goal)    [x] Progressing: [] Met: [] Not Met: [] Adjusted     ASSESSMENT: Krystle Myles continues to make progress towards goals. Progresses step ups to include emphasis on concentric phase for both forward and lateral. Added posterior and lateral lunges with good tolerance. No restriction felt in left hip through passive abduction and extension. Added Monster walks to facilitate diagonal movements important for pickleball and home renovations. Krystle Myles reports noticing strength gains since before surgery. He does note that his lumbar spine limits him to some degree. Will add exercise for lumbar ROM next visit. Krystle Myles is nearing DC.     Return to Play: (if applicable)   []  Stage 1: Intro to Strength   []  Stage 2: Return to Run and Strength   []  Stage 3: Return to Jump and Strength   []  Stage 4: Dynamic Strength and Agility   []  Stage 5: Sport Specific Training     []  Ready to Return to Play, Meets All Above Stages   []  Not Ready for Return to Sports   Comments:            Treatment/Activity Tolerance:  [x] Patient tolerated treatment well [] Patient limited by fatique  [] Patient limited by pain  [] Patient limited by other medical complications  [] Other:     Overall Progression Towards Functional goals/ Treatment Progress Update:  [x] Patient is progressing as expected towards functional goals listed. [] Progression is slowed due to complexities/Impairments listed. [] Progression has been slowed due to co-morbidities. [] Plan just implemented, too soon to assess goals progression <30days   [] Goals require adjustment due to lack of progress  [] Patient is not progressing as expected and requires additional follow up with physician  [] Other    Prognosis for POC: [x] Good [] Fair  [] Poor    Patient requires continued skilled intervention: [x] Yes  [] No        PLAN: Progress To plyometric activities (lunges)  [] Continue per plan of care [] Alter current plan (see comments)  [x] Plan of care initiated [] Hold pending MD visit [] Discharge    Electronically signed by: Mecca Hernandez PT DPT, MS    Note: If patient does not return for scheduled/recommended follow up visits, this note will serve as a discharge from care along with the most recent update on progress.

## 2023-03-15 ENCOUNTER — HOSPITAL ENCOUNTER (OUTPATIENT)
Dept: PHYSICAL THERAPY | Age: 78
Setting detail: THERAPIES SERIES
Discharge: HOME | End: 2023-03-15
Payer: MEDICARE

## 2023-03-15 PROCEDURE — 97530 THERAPEUTIC ACTIVITIES: CPT

## 2023-03-15 PROCEDURE — 97110 THERAPEUTIC EXERCISES: CPT

## 2023-03-15 NOTE — PLAN OF CARE
[place] [address] [place] [address]  Phone: [phone] Fax: [phone]        Physical Therapy Re-Certification Plan of [facility] Late      Dear Dr. Ajit Floyd MD ,    We had the pleasure of treating the following patient for physical therapy services at [address] [place] [address]. A summary of our findings can be found in the updated assessment below. This includes our plan of care. If you have any questions or concerns regarding these findings, please do not hesitate to contact me at the office phone number checked above. Thank you for the referral.     Physician Signature:________________________________Date:__________________  By signing above (or electronic signature), therapists plan is approved by physician    Date Range Of Visits: 02/08/2023 - 03/15/2023  Total Visits to Date: 12  Overall Response to Treatment:   []Patient is responding well to treatment and improvement is noted with regards  to goals   []Patient should continue to improve in reasonable time if they continue HEP   []Patient has plateaued and is no longer responding to skilled PT intervention    []Patient is getting worse and would benefit from return to referring MD   []Patient unable to adhere to initial POC   []Other:     ASSESSMENT: POC this date. Sandro Sorto continues with meeting 2/2 STG, 1/6 LTGs but nearing AROM, strength, LEFS, and playing pickleball. Sandro November states that his biggest deficit is in his hip ROM and confidence with dynamic activities. Objective measures taking above with PROM near equivalent. Session focused on AROM into hip rotation this date. Would like to FU next week with another session including dynamic lateral movements mimicking pickle ball movements in order to increase Maycol's confidence. Sees Dr. Ajit Floyd Tuesday Next week.        Physical Therapy Treatment Note/ Progress Report:       Date:  03/15/2023    Patient Name:  Rosana Avila Harrison    :  1945  MRN: [id number]  Restrictions/Precautions:    Medical/Treatment Diagnosis Information:  Diagnosis: Z96.642- S.P left hip replacement       ICD-10-CM    1. Hip pain, left  M25.552       2. Decreased strength of lower extremity  R29.898       3. Decreased range of left hip movement  M25.652           Insurance/Certification information:  PT Insurance Information: Medicare/ AARP  Physician Information:  Silvino Del Angel*  Plan of care signed (Y/N):     Date of Patient follow up with Physician:      Progress Report: [x]  Yes  []  No     Date Range for reporting period:  Beginnin2023  Ending:     Progress report due (10 Rx/or 30 days whichever is less): 2023     Recertification due (POC duration/ or 90 days whichever is less): 2023     Visit # Insurance Allowable Auth Needed   12 [place] []Yes    [x]No     Latex Allergy:  [x]NO      []YES  Preferred Language for Healthcare:   [x]English       []other:  Functional Scale: LEFS 30/80, 62.5% dysfunction  Date assessed:2023  Functional Scale: LEFS 65/80, 18.75% dysfunction  Date assessed:2023    Pain level:  3/10     SUBJECTIVE:  Maycol states that his greatest deficits include confidence in activities. He states he feels roughly 80% improvement since surgery. He states that he feels his left knee continues to want to buckle once in a while which complicates his confidence with his hip. He states that he has some difficulty getting into his truck and scooting into the right position.     OBJECTIVE: See eval  Observation:   Test measurements:      MMT   L R  Hip abd 4+ 5  ROM   Hip Flexion  75 85   Knee Flexion 130 130   Hip ER  45 50   Hip IR  20 23    RESTRICTIONS/PRECAUTIONS: Left CATRINA 2023    Exercises/Interventions:     Therapeutic Ex (65992)   Min: Sets/sec Reps Notes/CUES   Retro Stepper/BIKE 5'           Alter G      BFR      Sportcord March      3 way SLR      SAQ      Clam ABD      Hip Ext /table     BOSU fwd/side lunge      BOSU squat      clamshells 2 10-15 BL   Cybex HS curl      TKE      Prone Quad Stretch    Quad set with SLR    Standing hamstring curl    Glute side walks/monster walks 3 15' Yellow thick   RDL      Seated Hip IR resisted 3 10-15 Red   Slide HS eccentrics      Step ups/ecc step down      Swissball wall rolls- in SLS- hip drive      Standing 3 way Leg Press Full Range Ecc 3 10- 15 130# c TB cue   TKE       BOSU Lunge Stomp with KB S2S 2 10 Bilateral, 10#   SAQ    Manual Intervention (12597)  Min:      Knee mobs/PROM      Tib/Fem Mobs      Patella Mobs      Ankle mobs         NMR re-education (60649)  Min:   CUES NEEDED   Stride Stance c rebounder 1 20 BL         Stride Stance c rebounder front on airex 1 20 BL   Dutch/Biofeedback 10/10      BFR      G. Med activation      Hip Ext full ROM/ G. Activation      Bosu Bal and Prop- G Med      Single leg stance/Balance/Prop      Bosu Retro G. Med act      Prone Hip froggers- sliders/elevated      Hip flexor isometric    Hip Add iso c bridge Hip Add Isometric Hip Abd iso c bridge Hip Abd Isometric Therapeutic Activity (12426)  Min:      Assess: ROM, Goals, MMT, LEFS 10'     Ladders      Plyos      3 way lunge 1 10ea direction BL   Posterior lunge Lateral Lunge Pickleball lunge iso c volley Side stepping c rebound Reactive lunge c volley Lateral lunge left BOSU Sliders Lateral/posterior Squats KB press 3 15 12#   20# lift to shelf (Squat)      Step ups 8\" Lat 2 10 Knee Drive   Posterior Step down ecc 8\" 2 10    TRX Sit to Stand    Walking SPC    Walking no SPC    Stop overs c and c/out cane        Therapeutic Exercise and NMR EXR  [x] (37518) Provided verbal/tactile cueing for activities related to strengthening, flexibility, endurance, ROM for improvements in LE, proximal hip, and core control with self care, mobility, lifting, ambulation.   [x] (74089) Provided verbal/tactile cueing for activities related to improving balance, coordination, kinesthetic sense, posture, motor skill, proprioception  to assist with LE, proximal hip, and core control in self care, mobility, lifting, ambulation and eccentric single leg control. NMR and Therapeutic Activities:    [x] (83257 or 80840) Provided verbal/tactile cueing for activities related to improving balance, coordination, kinesthetic sense, posture, motor skill, proprioception and motor activation to allow for proper function of core, proximal hip and LE with self care and ADLs and functional mobility.   [] (30621) Gait Re-education- Provided training and instruction to the patient for proper LE, core and proximal hip recruitment and positioning and eccentric body weight control with ambulation re-education including up and down stairs     Home Exercise Program:    [x] (45019) Reviewed/Progressed HEP activities related to strengthening, flexibility, endurance, ROM of core, proximal hip and LE for functional self-care, mobility, lifting and ambulation/stair navigation   [] (92749)Reviewed/Progressed HEP activities related to improving balance, coordination, kinesthetic sense, posture, motor skill, proprioception of core, proximal hip and LE for self care, mobility, lifting, and ambulation/stair navigation      Manual Treatments:  PROM / STM / Oscillations-Mobs:  G-I, II, III, IV (PA's, Inf., Post.)  [] (06855) Provided manual therapy to mobilize LE, proximal hip and/or LS spine soft tissue/joints for the purpose of modulating pain, promoting relaxation,  increasing ROM, reducing/eliminating soft tissue swelling/inflammation/restriction, improving soft tissue extensibility and allowing for proper ROM for normal function with self care, mobility, lifting and ambulation. Modalities:     [x] GAME READY (VASO)- for significant edema, swelling, pain control.      Charges:  Timed Code Treatment Minutes: 57   Total Treatment Minutes: 57      [] EVAL (LOW) 84337 (typically 20 minutes face-to-face)  [] EVAL (MOD) 96360 (typically 30 minutes face-to-face)  [] EVAL (HIGH) 72760 (typically 45 minutes face-to-face)  [] RE-EVAL     [x] OE(18643) x   1  [] DRY NEEDLE 1 OR 2 MUSCLES  [] NMR (99780) x     [] DRY NEEDLE 3+ MUSCLES  [] Manual (82068) x       [x] TA (09428) x  2  [] Mech Traction (73173)  [] ES(attended) (54663)     [] ES (un) (67946):   [] VASO (39106) Min  [] Other:    If BWC Please Indicate Time In/Out  CPT Code Time in Time out                                   Access Code: [email or web address]  URL: [email or web address]  Date: 02/13/2023  Prepared by: Deborah Jones    Exercises  Hooklying Isometric Clamshell - 1-3 x daily - 7 x weekly - 1 sets - 10 reps - 10 seconds hold  Supine Hip Adduction Isometric with Ball - 1-3 x daily - 7 x weekly - 1 sets - 10 reps - 10 seconds hold  Supine Bridge with Mini Swiss Ball Between Knees - 1-3 x daily - 7 x weekly - 3 sets - 10 reps  Bridge with Hip Abduction and Resistance - 1-3 x daily - 7 x weekly - 3 sets - 10 reps  Hooklying Isometric Hip Flexion - 1 x daily - 7 x weekly - 3 sets - 10 reps  Prone Hip Extension on Table - 1 x daily - 3-5 x weekly - 3 sets - 10-15 reps  Side Stepping with Resistance at Ankles - 1 x daily - 3-5 x weekly - 3 sets - 10 reps  Reverse Lunge on Slider - 1 x daily - 3 x weekly - 2 sets - 15 reps  Mini Squat - 1 x daily - 3 x weekly - 3 sets - 10 reps      GOALS:  Patient stated goal: regular activity  [x] Progressing: [] Met: [] Not Met: [] Adjusted    Therapist goals for Patient:   Short Term Goals: To be achieved in: 2 weeks  1. Independent in HEP and progression per patient tolerance, in order to prevent re-injury. [] Progressing: [x] Met: [] Not Met: [] Adjusted  2. Patient will have a decrease in pain to facilitate improvement in movement, function, and ADLs as indicated by Functional Deficits. [] Progressing: [x] Met: [] Not Met: [] Adjusted    Long Term Goals: To be achieved in: 8 weeks  1.  Disability index score of 10% or less for the LEFS to assist with reaching prior level of function. [x] Progressing: [] Met: [] Not Met: [] Adjusted  2. Patient will demonstrate increased AROM to Hip flexion 90 degrees, knee flexion 130 degrees to allow for proper joint functioning as indicated by patients Functional Deficits. [x] Progressing: [] Met: [] Not Met: [x] Adjusted Hip flexion limited BL to 80 degrees  3. Patient will demonstrate an increase in hip flexion, abduction, quad, hamstring  5/5 Strength to good proximal hip strength and control, within 5lb HHD in LE to allow for proper functional mobility as indicated by patients Functional Deficits. [x] Progressing: [] Met: [] Not Met: [] Adjusted  4. Patient will return to Sit to stand 2x10 functional activities without increased symptoms or restriction. [] Progressing: [x] Met: [] Not Met: [] Adjusted  5. Patient will return to playing pickleball (half game) without increased symptoms or restriction (patient specific functional goal)    [x] Progressing: [] Met: [] Not Met: [] Adjusted     ASSESSMENT: POC this date. Dora Fenton continues with meeting 2/2 STG, 1/6 LTGs but nearing AROM, strength, LEFS, and playing pickleball. Dora Fenton states that his biggest deficit is in his hip ROM and confidence with dynamic activities. Objective measures taking above with PROM near equivalent. Session focused on AROM into hip rotation this date. Would like to FU next week with another session including dynamic lateral movements mimicking pickle ball movements in order to increase Maycol's confidence. Sees Dr. Rachel Santacruz Tuesday Next week.      Return to Play: (if applicable)   []  Stage 1: Intro to Strength   []  Stage 2: Return to Run and Strength   []  Stage 3: Return to Jump and Strength   []  Stage 4: Dynamic Strength and Agility   []  Stage 5: Sport Specific Training     []  Ready to Return to Play, Meets All Above Stages   []  Not Ready for Return to Sports   Comments:            Treatment/Activity Tolerance:  [x] Patient tolerated treatment well [] Patient limited by fatique  [] Patient limited by pain  [] Patient limited by other medical complications  [] Other:     Overall Progression Towards Functional goals/ Treatment Progress Update:  [x] Patient is progressing as expected towards functional goals listed. [] Progression is slowed due to complexities/Impairments listed. [] Progression has been slowed due to co-morbidities. [] Plan just implemented, too soon to assess goals progression <30days   [] Goals require adjustment due to lack of progress  [] Patient is not progressing as expected and requires additional follow up with physician  [] Other    Prognosis for POC: [x] Good [] Fair  [] Poor    Patient requires continued skilled intervention: [x] Yes  [] No        PLAN: Progress To plyometric activities (lunges)  [] Continue per plan of care [] Alter current plan (see comments)  [x] Plan of care initiated [] Hold pending MD visit [] Discharge    Electronically signed by: Jesse Weir, PT DPT, MS    Note: If patient does not return for scheduled/recommended follow up visits, this note will serve as a discharge from care along with the most recent update on progress.

## 2023-03-22 ENCOUNTER — HOSPITAL ENCOUNTER (OUTPATIENT)
Dept: PHYSICAL THERAPY | Age: 78
Setting detail: THERAPIES SERIES
Discharge: HOME | End: 2023-03-22
Payer: MEDICARE

## 2023-03-22 PROCEDURE — 97110 THERAPEUTIC EXERCISES: CPT

## 2023-03-22 PROCEDURE — 97530 THERAPEUTIC ACTIVITIES: CPT

## 2023-03-22 PROCEDURE — 97140 MANUAL THERAPY 1/> REGIONS: CPT

## 2023-03-22 NOTE — PLAN OF CARE
to Run and Strength   []  Stage 3: Return to Jump and Strength   []  Stage 4: Dynamic Strength and Agility   []  Stage 5: Sport Specific Training     []  Ready to Return to Play, Meets All Above Stages   []  Not Ready for Return to Sports   Comments:            Treatment/Activity Tolerance:  [x] Patient tolerated treatment well [] Patient limited by fatique  [] Patient limited by pain  [] Patient limited by other medical complications  [] Other:     Overall Progression Towards Functional goals/ Treatment Progress Update:  [x] Patient is progressing as expected towards functional goals listed. [] Progression is slowed due to complexities/Impairments listed. [] Progression has been slowed due to co-morbidities. [] Plan just implemented, too soon to assess goals progression <30days   [] Goals require adjustment due to lack of progress  [] Patient is not progressing as expected and requires additional follow up with physician  [] Other    Prognosis for POC: [x] Good [] Fair  [] Poor    Patient requires continued skilled intervention: [x] Yes  [] No        PLAN: Progress To plyometric activities (lunges)  [] Continue per plan of care [] Alter current plan (see comments)  [x] Plan of care initiated [] Hold pending MD visit [] Discharge    Electronically signed by: Benigno Vidales, PT DPT, MS    Note: If patient does not return for scheduled/recommended follow up visits, this note will serve as a discharge from care along with the most recent update on progress.

## 2023-03-22 NOTE — FLOWSHEET NOTE
strengthening, flexibility, endurance, ROM for improvements in LE, proximal hip, and core control with self care, mobility, lifting, ambulation. [x] (78153) Provided verbal/tactile cueing for activities related to improving balance, coordination, kinesthetic sense, posture, motor skill, proprioception  to assist with LE, proximal hip, and core control in self care, mobility, lifting, ambulation and eccentric single leg control.      NMR and Therapeutic Activities:    [x] (36706 or 96352) Provided verbal/tactile cueing for activities related to improving balance, coordination, kinesthetic sense, posture, motor skill, proprioception and motor activation to allow for proper function of core, proximal hip and LE with self care and ADLs and functional mobility.   [] (56199) Gait Re-education- Provided training and instruction to the patient for proper LE, core and proximal hip recruitment and positioning and eccentric body weight control with ambulation re-education including up and down stairs     Home Exercise Program:    [x] (73733) Reviewed/Progressed HEP activities related to strengthening, flexibility, endurance, ROM of core, proximal hip and LE for functional self-care, mobility, lifting and ambulation/stair navigation   [] (01905)Reviewed/Progressed HEP activities related to improving balance, coordination, kinesthetic sense, posture, motor skill, proprioception of core, proximal hip and LE for self care, mobility, lifting, and ambulation/stair navigation      Manual Treatments:  PROM / STM / Oscillations-Mobs:  G-I, II, III, IV (PA's, Inf., Post.)  [] (49490) Provided manual therapy to mobilize LE, proximal hip and/or LS spine soft tissue/joints for the purpose of modulating pain, promoting relaxation,  increasing ROM, reducing/eliminating soft tissue swelling/inflammation/restriction, improving soft tissue extensibility and allowing for proper ROM for normal function with self care, mobility, lifting and

## 2023-03-29 ENCOUNTER — HOSPITAL ENCOUNTER (OUTPATIENT)
Dept: PHYSICAL THERAPY | Age: 78
Setting detail: THERAPIES SERIES
Discharge: HOME | End: 2023-03-29
Payer: MEDICARE

## 2023-03-29 PROCEDURE — 97110 THERAPEUTIC EXERCISES: CPT

## 2023-03-29 PROCEDURE — 97530 THERAPEUTIC ACTIVITIES: CPT

## 2023-03-29 PROCEDURE — 97140 MANUAL THERAPY 1/> REGIONS: CPT

## 2023-03-29 NOTE — FLOWSHEET NOTE
function, and ADLs as indicated by Functional Deficits. [] Progressing: [x] Met: [] Not Met: [] Adjusted    Long Term Goals: To be achieved in: 8 weeks  1. Disability index score of 10% or less for the LEFS to assist with reaching prior level of function. [x] Progressing: [] Met: [] Not Met: [] Adjusted  2. Patient will demonstrate increased AROM to Hip flexion 90 degrees, knee flexion 130 degrees to allow for proper joint functioning as indicated by patients Functional Deficits. [] Progressing: [x] Met: [] Not Met: [x] Adjusted Hip flexion limited BL to 80 degrees  3. Patient will demonstrate an increase in hip flexion, abduction, quad, hamstring  5/5 Strength to good proximal hip strength and control, within 5lb HHD in LE to allow for proper functional mobility as indicated by patients Functional Deficits. [x] Progressing: [] Met: [] Not Met: [] Adjusted  4. Patient will return to Sit to stand 2x10 functional activities without increased symptoms or restriction. [] Progressing: [x] Met: [] Not Met: [] Adjusted  5. Patient will return to playing pickleball (half game) without increased symptoms or restriction (patient specific functional goal)    [x] Progressing: [] Met: [] Not Met: [] Adjusted     ASSESSMENT: Giselle Washburn continued to present with pain in groin, mostly adductor laurent. States that he has been walking longer. Most likely adductor laurent compensation into hip extension. This session regressed interventions to table for clamshells to isolate external rotators. Added Hip fall out stretch as well. Isolated extension on the treadmill with cues for heel to toe and utilizing gluteal muscles as opposed to adductor laurent. Progressed to functional engagement of posterolateral hip via multidirectional lunges. Will cont to benefit from skilled PT services to progress to PLOF.      Return to Play: (if applicable)   []  Stage 1: Intro to Strength   []  Stage 2: Return to Run and Strength   []  Stage 3: Return to

## 2023-04-05 ENCOUNTER — HOSPITAL ENCOUNTER (OUTPATIENT)
Dept: PHYSICAL THERAPY | Age: 78
Setting detail: THERAPIES SERIES
Discharge: HOME | End: 2023-04-05
Payer: MEDICARE

## 2023-04-05 PROCEDURE — 97530 THERAPEUTIC ACTIVITIES: CPT

## 2023-04-05 PROCEDURE — 97110 THERAPEUTIC EXERCISES: CPT

## 2023-04-05 PROCEDURE — 97140 MANUAL THERAPY 1/> REGIONS: CPT

## 2023-04-05 NOTE — PLAN OF CARE
Training     []  Ready to Return to Play, Meets All Above Stages   []  Not Ready for Return to Sports   Comments:            Treatment/Activity Tolerance:  [x] Patient tolerated treatment well [] Patient limited by fatique  [] Patient limited by pain  [] Patient limited by other medical complications  [] Other:     Overall Progression Towards Functional goals/ Treatment Progress Update:  [x] Patient is progressing as expected towards functional goals listed. [] Progression is slowed due to complexities/Impairments listed. [] Progression has been slowed due to co-morbidities. [] Plan just implemented, too soon to assess goals progression <30days   [] Goals require adjustment due to lack of progress  [] Patient is not progressing as expected and requires additional follow up with physician  [] Other    Prognosis for POC: [x] Good [] Fair  [] Poor    Patient requires continued skilled intervention: [x] Yes  [] No        PLAN: Progress To plyometric activities (lunges)  [] Continue per plan of care [] Alter current plan (see comments)  [] Plan of care initiated [] Hold pending MD visit [x] Discharge    Electronically signed by: Kassandra Batista PT DPT, MS    Note: If patient does not return for scheduled/recommended follow up visits, this note will serve as a discharge from care along with the most recent update on progress.

## 2023-10-09 ENCOUNTER — OFFICE VISIT (OUTPATIENT)
Dept: CARDIOLOGY CLINIC | Age: 78
End: 2023-10-09
Payer: MEDICARE

## 2023-10-09 VITALS
OXYGEN SATURATION: 95 % | HEART RATE: 57 BPM | WEIGHT: 218 LBS | HEIGHT: 73 IN | DIASTOLIC BLOOD PRESSURE: 84 MMHG | BODY MASS INDEX: 28.89 KG/M2 | SYSTOLIC BLOOD PRESSURE: 132 MMHG

## 2023-10-09 DIAGNOSIS — R93.1 ELEVATED CORONARY ARTERY CALCIUM SCORE: ICD-10-CM

## 2023-10-09 DIAGNOSIS — I47.10 PSVT (PAROXYSMAL SUPRAVENTRICULAR TACHYCARDIA): ICD-10-CM

## 2023-10-09 DIAGNOSIS — I70.90 ATHEROSCLEROSIS: Primary | ICD-10-CM

## 2023-10-09 DIAGNOSIS — R93.1 AGATSTON CORONARY ARTERY CALCIUM SCORE GREATER THAN 400: ICD-10-CM

## 2023-10-09 DIAGNOSIS — E78.5 HYPERLIPIDEMIA, UNSPECIFIED HYPERLIPIDEMIA TYPE: ICD-10-CM

## 2023-10-09 DIAGNOSIS — R00.1 BRADYCARDIA: ICD-10-CM

## 2023-10-09 DIAGNOSIS — R00.2 PALPITATIONS: ICD-10-CM

## 2023-10-09 DIAGNOSIS — G47.33 OSA (OBSTRUCTIVE SLEEP APNEA): ICD-10-CM

## 2023-10-09 DIAGNOSIS — R06.09 DOE (DYSPNEA ON EXERTION): ICD-10-CM

## 2023-10-09 PROCEDURE — 1036F TOBACCO NON-USER: CPT | Performed by: INTERNAL MEDICINE

## 2023-10-09 PROCEDURE — G8484 FLU IMMUNIZE NO ADMIN: HCPCS | Performed by: INTERNAL MEDICINE

## 2023-10-09 PROCEDURE — G8419 CALC BMI OUT NRM PARAM NOF/U: HCPCS | Performed by: INTERNAL MEDICINE

## 2023-10-09 PROCEDURE — G8427 DOCREV CUR MEDS BY ELIG CLIN: HCPCS | Performed by: INTERNAL MEDICINE

## 2023-10-09 PROCEDURE — 93000 ELECTROCARDIOGRAM COMPLETE: CPT | Performed by: INTERNAL MEDICINE

## 2023-10-09 PROCEDURE — 1123F ACP DISCUSS/DSCN MKR DOCD: CPT | Performed by: INTERNAL MEDICINE

## 2023-10-09 PROCEDURE — 99204 OFFICE O/P NEW MOD 45 MIN: CPT | Performed by: INTERNAL MEDICINE

## 2023-10-09 RX ORDER — METOPROLOL SUCCINATE 25 MG/1
25 TABLET, EXTENDED RELEASE ORAL DAILY
Qty: 90 TABLET | Refills: 1 | COMMUNITY
Start: 2023-10-09 | End: 2023-10-09

## 2023-10-09 RX ORDER — METOPROLOL SUCCINATE 50 MG/1
25 TABLET, EXTENDED RELEASE ORAL 2 TIMES DAILY
Qty: 90 TABLET | Refills: 0
Start: 2023-10-09

## 2023-10-17 ENCOUNTER — TELEPHONE (OUTPATIENT)
Dept: CARDIOLOGY CLINIC | Age: 78
End: 2023-10-17

## 2023-10-17 ENCOUNTER — HOSPITAL ENCOUNTER (OUTPATIENT)
Dept: NON INVASIVE DIAGNOSTICS | Age: 78
Discharge: HOME | End: 2023-10-17
Payer: MEDICARE

## 2023-10-17 DIAGNOSIS — E78.5 HYPERLIPIDEMIA, UNSPECIFIED HYPERLIPIDEMIA TYPE: ICD-10-CM

## 2023-10-17 DIAGNOSIS — I70.90 ATHEROSCLEROSIS: ICD-10-CM

## 2023-10-17 DIAGNOSIS — R06.09 DOE (DYSPNEA ON EXERTION): ICD-10-CM

## 2023-10-17 DIAGNOSIS — R93.1 ELEVATED CORONARY ARTERY CALCIUM SCORE: ICD-10-CM

## 2023-10-17 PROCEDURE — 3430000000 HC RX DIAGNOSTIC RADIOPHARMACEUTICAL: Performed by: INTERNAL MEDICINE

## 2023-10-17 PROCEDURE — 78452 HT MUSCLE IMAGE SPECT MULT: CPT | Performed by: INTERNAL MEDICINE

## 2023-10-17 PROCEDURE — 6360000002 HC RX W HCPCS: Performed by: INTERNAL MEDICINE

## 2023-10-17 PROCEDURE — A9502 TC99M TETROFOSMIN: HCPCS | Performed by: INTERNAL MEDICINE

## 2023-10-17 PROCEDURE — 93017 CV STRESS TEST TRACING ONLY: CPT | Performed by: INTERNAL MEDICINE

## 2023-10-17 RX ORDER — REGADENOSON 0.08 MG/ML
0.4 INJECTION, SOLUTION INTRAVENOUS
Status: COMPLETED | OUTPATIENT
Start: 2023-10-17 | End: 2023-10-17

## 2023-10-17 RX ADMIN — TETROFOSMIN 30 MILLICURIE: 1.38 INJECTION, POWDER, LYOPHILIZED, FOR SOLUTION INTRAVENOUS at 10:12

## 2023-10-17 RX ADMIN — REGADENOSON 0.4 MG: 0.08 INJECTION, SOLUTION INTRAVENOUS at 09:56

## 2023-10-17 RX ADMIN — TETROFOSMIN 10 MILLICURIE: 1.38 INJECTION, POWDER, LYOPHILIZED, FOR SOLUTION INTRAVENOUS at 08:44

## 2023-10-17 NOTE — TELEPHONE ENCOUNTER
Dr Alissa Casper called and discussed stress test findings with pt. Per Dr Alissa Casper, patient to see interventional cardiologist. I called pt and scheduled him with Dr Lary Adams on 10/30/23 at [facility]. Provided patient with date/time/location for appt. He is agreeable to appt.

## 2023-10-17 NOTE — PROGRESS NOTES
Instructed on Lexiscan Stress Test Procedure including possible side effects/ adverse reactions. Patient verbalizes  understanding and denies having any questions . See [id number] [id number] Cardiology

## 2023-10-24 PROBLEM — R94.39 ABNORMAL STRESS TEST: Status: ACTIVE | Noted: 2023-10-24

## 2023-10-24 PROBLEM — G47.33 OBSTRUCTIVE SLEEP APNEA: Status: ACTIVE | Noted: 2018-01-16

## 2023-10-24 NOTE — PATIENT INSTRUCTIONS
Stop simvastatin.  Start crestor 20mg for cholesterol    Complete coronary CTA  Take your metoprolol as you usually do  We will call you with the results from the test

## 2023-10-24 NOTE — PROGRESS NOTES
[facility]    10/30/2023    Referring Provider: Sabina Turk MD    HISTORY:  Ninfa Lezama is a 69 y.o. male presenting to our office for an abnormal stress test on 10/17/2023. He previously had an elevated calcium score. On 8/10/2023. His electrophysiologist, Dr. Douglas Puente then ordered a stress test. He has a past medical history of hyperlipidemia, ARAMIS, and palpitations. Today he arrives with his wife and is ambulatory. Denies chest pain/pressure/squeezing/tightness, dizziness/lightheadedness, shortness of breath. He does report some SOB with heavy exertion including dragging tarps of leaves across the yard but no dyspnea with day to day activities. Was able to play pickle ball for several hours at a time up until about a month ago d/t hip issues. He has occasional bouts of known SVT that reliably break after 15-30 seconds spontaneously or with vagal maneuvers. REVIEW OF SYSTEMS:  A complete review of systems was reviewed and is negative except as noted in the history of present illness. Prior to Visit Medications    Medication Sig Taking?  Authorizing Provider   ibuprofen (ADVIL;MOTRIN) 400 MG tablet Take 1 tablet by mouth every 6 hours as needed Yes Chary Díaz MD   rosuvastatin (CRESTOR) 20 MG tablet Take 1 tablet by mouth daily Yes Le Mast MD   metoprolol succinate (TOPROL XL) 50 MG extended release tablet Take 0.5 tablets by mouth in the morning and at bedtime Yes Le Mast MD   aspirin 81 MG tablet Take 1 tablet by mouth daily Yes ProviderChary MD      No Known Allergies  Past Medical History:   Diagnosis Date    Hyperlipidemia     Hypertension 2013    ARAMIS (obstructive sleep apnea)      Past Surgical History:   Procedure Laterality Date    HERNIA REPAIR      KNEE ARTHROSCOPY Left     OTHER SURGICAL HISTORY Left 03/22/2018    LEFT KNEE ARTHROSCOPE,PLICA EXCISION;SYNOVECTOMY PARTIAL    SHOULDER SURGERY Right     NERVE RELEASE - 2001    TOTAL HIP

## 2023-10-30 ENCOUNTER — OFFICE VISIT (OUTPATIENT)
Dept: CARDIOLOGY CLINIC | Age: 78
End: 2023-10-30
Payer: MEDICARE

## 2023-10-30 ENCOUNTER — TELEPHONE (OUTPATIENT)
Dept: INTERVENTIONAL RADIOLOGY/VASCULAR | Age: 78
End: 2023-10-30

## 2023-10-30 VITALS
WEIGHT: 220.2 LBS | SYSTOLIC BLOOD PRESSURE: 122 MMHG | HEART RATE: 53 BPM | BODY MASS INDEX: 29.18 KG/M2 | OXYGEN SATURATION: 97 % | HEIGHT: 73 IN | DIASTOLIC BLOOD PRESSURE: 78 MMHG

## 2023-10-30 DIAGNOSIS — R94.39 ABNORMAL STRESS TEST: ICD-10-CM

## 2023-10-30 DIAGNOSIS — R00.2 PALPITATIONS: ICD-10-CM

## 2023-10-30 DIAGNOSIS — G47.33 OBSTRUCTIVE SLEEP APNEA: ICD-10-CM

## 2023-10-30 DIAGNOSIS — E78.5 HYPERLIPIDEMIA, UNSPECIFIED HYPERLIPIDEMIA TYPE: ICD-10-CM

## 2023-10-30 PROCEDURE — 1123F ACP DISCUSS/DSCN MKR DOCD: CPT | Performed by: STUDENT IN AN ORGANIZED HEALTH CARE EDUCATION/TRAINING PROGRAM

## 2023-10-30 PROCEDURE — 93000 ELECTROCARDIOGRAM COMPLETE: CPT | Performed by: STUDENT IN AN ORGANIZED HEALTH CARE EDUCATION/TRAINING PROGRAM

## 2023-10-30 PROCEDURE — 99204 OFFICE O/P NEW MOD 45 MIN: CPT | Performed by: STUDENT IN AN ORGANIZED HEALTH CARE EDUCATION/TRAINING PROGRAM

## 2023-10-30 RX ORDER — IBUPROFEN 400 MG/1
400 TABLET ORAL EVERY 6 HOURS PRN
COMMUNITY

## 2023-10-30 RX ORDER — METOPROLOL SUCCINATE 50 MG/1
25 TABLET, EXTENDED RELEASE ORAL 2 TIMES DAILY
Qty: 90 TABLET | Refills: 3 | Status: SHIPPED | OUTPATIENT
Start: 2023-10-30

## 2023-10-30 RX ORDER — ROSUVASTATIN CALCIUM 20 MG/1
20 TABLET, COATED ORAL DAILY
Qty: 90 TABLET | Refills: 3 | Status: SHIPPED | OUTPATIENT
Start: 2023-10-30

## 2023-11-02 ENCOUNTER — HOSPITAL ENCOUNTER (OUTPATIENT)
Dept: CT IMAGING | Age: 78
Discharge: HOME | End: 2023-11-02
Attending: STUDENT IN AN ORGANIZED HEALTH CARE EDUCATION/TRAINING PROGRAM
Payer: MEDICARE

## 2023-11-02 VITALS
OXYGEN SATURATION: 97 % | HEART RATE: 53 BPM | HEIGHT: 73 IN | TEMPERATURE: 98 F | DIASTOLIC BLOOD PRESSURE: 77 MMHG | BODY MASS INDEX: 28.49 KG/M2 | WEIGHT: 215 LBS | SYSTOLIC BLOOD PRESSURE: 149 MMHG | RESPIRATION RATE: 18 BRPM

## 2023-11-02 DIAGNOSIS — R94.39 ABNORMAL STRESS TEST: ICD-10-CM

## 2023-11-02 LAB
CREAT SERPL-MCNC: 0.8 MG/DL (ref 0.8–1.3)
GFR SERPLBLD CREATININE-BSD FMLA CKD-EPI: >60 ML/MIN/{1.73_M2}

## 2023-11-02 PROCEDURE — 75574 CT ANGIO HRT W/3D IMAGE: CPT

## 2023-11-02 PROCEDURE — 6370000000 HC RX 637 (ALT 250 FOR IP): Performed by: RADIOLOGY

## 2023-11-02 PROCEDURE — 82565 ASSAY OF CREATININE: CPT

## 2023-11-02 PROCEDURE — 6360000004 HC RX CONTRAST MEDICATION: Performed by: STUDENT IN AN ORGANIZED HEALTH CARE EDUCATION/TRAINING PROGRAM

## 2023-11-02 PROCEDURE — 36415 COLL VENOUS BLD VENIPUNCTURE: CPT

## 2023-11-02 RX ORDER — NITROGLYCERIN 0.4 MG/1
0.4 TABLET SUBLINGUAL ONCE
Status: COMPLETED | OUTPATIENT
Start: 2023-11-02 | End: 2023-11-02

## 2023-11-02 RX ADMIN — IOPAMIDOL 120 ML: 755 INJECTION, SOLUTION INTRAVENOUS at 12:34

## 2023-11-02 RX ADMIN — NITROGLYCERIN 0.4 MG: 0.4 TABLET, ORALLY DISINTEGRATING SUBLINGUAL at 12:41

## 2023-11-02 ASSESSMENT — PAIN - FUNCTIONAL ASSESSMENT: PAIN_FUNCTIONAL_ASSESSMENT: NONE - DENIES PAIN

## 2023-11-02 NOTE — FLOWSHEET NOTE
Pt tolerated procedure well. VSS. IV removed without difficulty. Pt given d/c instructions and stated understanding. Released in stable condition to home.  /105

## 2023-11-29 ENCOUNTER — TELEPHONE (OUTPATIENT)
Dept: CASE MANAGEMENT | Age: 78
End: 2023-11-29

## 2023-11-29 NOTE — TELEPHONE ENCOUNTER
Call to Dr Mathieu Cardenas with [facility]. Will fax CTA cardiac to them at [phone] for Md review of recommendations for incidental pulmonary nodule noted.

## 2023-12-14 ENCOUNTER — OFFICE VISIT (OUTPATIENT)
Dept: CARDIOLOGY CLINIC | Age: 78
End: 2023-12-14

## 2023-12-14 VITALS
HEART RATE: 45 BPM | BODY MASS INDEX: 28.63 KG/M2 | SYSTOLIC BLOOD PRESSURE: 124 MMHG | HEIGHT: 73 IN | DIASTOLIC BLOOD PRESSURE: 70 MMHG | OXYGEN SATURATION: 95 % | WEIGHT: 216 LBS

## 2023-12-14 DIAGNOSIS — I25.10 CORONARY ARTERY DISEASE INVOLVING NATIVE CORONARY ARTERY OF NATIVE HEART WITHOUT ANGINA PECTORIS: ICD-10-CM

## 2023-12-14 DIAGNOSIS — R00.1 BRADYCARDIA: ICD-10-CM

## 2023-12-14 DIAGNOSIS — E78.5 HYPERLIPIDEMIA, UNSPECIFIED HYPERLIPIDEMIA TYPE: ICD-10-CM

## 2023-12-14 DIAGNOSIS — I47.10 PSVT (PAROXYSMAL SUPRAVENTRICULAR TACHYCARDIA): Primary | ICD-10-CM

## 2023-12-14 DIAGNOSIS — G47.33 OSA (OBSTRUCTIVE SLEEP APNEA): ICD-10-CM

## 2023-12-14 DIAGNOSIS — R00.2 PALPITATIONS: ICD-10-CM

## 2023-12-14 NOTE — PROGRESS NOTES
[facility]   Cardiac Consultation    Referring Provider:  No primary care provider on file. Interventional Cardiologist:  Mary Ham MD     Chief Complaint   Patient presents with    Tachycardia     Psvt on meds    Coronary Artery Disease       HPI:  Silvana Adams is a 69 y.o. male with a past medical history significant for hyperlipidemia, ARAMIS and palpitations. He was last seen in 2016 for palpitations. In 2016 he reported that most of his palpitations tend to occur at night and wake him up from his sleep,usually between 1-3 am, described as heart racing with regular rhythm. He did have one episode that occurred during the day. Episode lasted up to 30 minutes although majority of episodes lasted a few minutes. He has had 10-12 episodes since onset of symptoms. He has not identified triggers for palpitations. He denies associated symptoms of shortness of breath, chest discomfort, dizziness. He drinks coffee in the am but he doesn't consume caffeinated products later in the day. He underwent Nuclear stress test in 2014 due to complaints in palpitations which showed normal perfusion and EF 63%. He wore a holter monitor in 5/2016 which showed SR with avg HR 62 (), 37 PACs, 3543 PVCs. MCOT monitor worn 4/4/17- 5/3/17 and showed SR with PVCs, all of which were automatic triggers. He did not have his concerning sx. 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 He presented to office for evaluation of Elevated CA score 10/2023. He stated the he had the test done just as a check. Denies exertional chest pain/ pressure but reported worsening ERIC. He has palpitations that abort in 20 secs with vagal maneuver. This occurred sometimes twice a week. He completed NM pharmacological stress test that was abnormal and referred to interventional cardiology who recommended Coronary CTA. See result below. Today, he presents to office for follow up.  He denies

## 2023-12-14 NOTE — PROGRESS NOTES
[facility]   Cardiac Consultation    Referring Provider:  No primary care provider on file. No chief complaint on file. HPI:  Isabelle Schaefer is a 69 y.o. male with a past medical history significant for hyperlipidemia, ARAMIS and palpitations. He was last seen in 2016 for palpitations. In 2016 he reported that most of his palpitations tend to occur at night and wake him up from his sleep,usually between 1-3 am, described as heart racing with regular rhythm. He did have one episode that occurred during the day. Episode lasted up to 30 minutes although majority of episodes lasted a few minutes. He has had 10-12 episodes since onset of symptoms. He has not identified triggers for palpitations. He denies associated symptoms of shortness of breath, chest discomfort, dizziness. He drinks coffee in the am but he doesn't consume caffeinated products later in the day. He underwent Nuclear stress test in 2014 due to complaints in palpitations which showed normal perfusion and EF 63%. He wore a holter monitor in 5/2016 which showed SR with avg HR 62 (), 37 PACs, 3543 PVCs. MCOT monitor worn 4/4/17- 5/3/17 and showed SR with PVCs, all of which were automatic triggers. He did not have his concerning sx. 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 He presented to office for evaluation of Elevated CA score 10/2023. He stated the he had the test done just as a check. Denies exertional chest pain/ pressure but reported worsening ERIC. He has palpitations taht abort in 20 secs with vagal maneuver. This occurred sometimes twice a week. He completed NM pharmacological stress test that was abnormal and referred to interventional cardiology who recommended Coronary CTA. Today,     Past Medical History:   has a past medical history of Hyperlipidemia, Hypertension, and ARAMIS (obstructive sleep apnea).     Surgical History:   has a past surgical history that includes hernia

## 2024-04-25 NOTE — PROGRESS NOTES
[facility] HEART [place]    5/21/2024    Referring Provider: No primary care provider on file.    HISTORY:  Suman Terry is a 78 y.o. male presenting for his routine 6 month cardiovascular follow up. His EP provider, Dr. Morrison ordered a stress test which resulted abnormally. A CTA was completed 11/2/2023. It showed areas of mild luminal narrowing in the RCA and LCx. There was a focal area of calcified plaque seen proximally in the LAD, estimated at less than 70%. Maycol denied having cardiac symptoms at that time. He also has a history of HLD, SVT, and ARAMIS.     Today he arrives with his wife. Denies chest pain/pressure/squeezing/tightness, dizziness/lightheadedness, shortness of breath/dyspnea on exertion. He plays pickleball frequently, multiple hours at a time multiple days per week. His only limitation is his mobility. He has no cardiac limitations. Tolerating medications well.     REVIEW OF SYSTEMS:  A complete review of systems was reviewed and is negative except as noted in the history of present illness.    Prior to Visit Medications    Medication Sig Taking? Authorizing Provider   rosuvastatin (CRESTOR) 20 MG tablet Take 1 tablet by mouth daily Yes Ricardo Avelar MD   metoprolol succinate (TOPROL XL) 50 MG extended release tablet Take 0.5 tablets by mouth in the morning and at bedtime Yes Ricardo Avelar MD   aspirin 81 MG tablet Take 1 tablet by mouth daily Yes ProviderChary MD      No Known Allergies  Past Medical History:   Diagnosis Date    Hyperlipidemia     Hypertension 2013    ARAMIS (obstructive sleep apnea)      Past Surgical History:   Procedure Laterality Date    HERNIA REPAIR      KNEE ARTHROSCOPY Left     OTHER SURGICAL HISTORY Left 03/22/2018    LEFT KNEE ARTHROSCOPE,PLICA EXCISION;SYNOVECTOMY PARTIAL    SHOULDER SURGERY Right     NERVE RELEASE - 2001    TOTAL HIP ARTHROPLASTY Left 02/23/2023     Social History     Tobacco Use    Smoking status: Former     Current packs/day: 0.00

## 2024-05-16 NOTE — PROGRESS NOTES
[facility]   Electrophysiology Follow up   Date: 5/21/2024  I had the privilege of visiting Suman Terry in the office.       CC: SVT    HPI: Suman Terry is a 78 y.o. male with a history of HLD, ARAMIS and palpitations.   In 2016 he reported palpitation mostly at night and wake him up from his sleep,  described as heart racing with regular rhythm.  Episode lasted up to 30 minutes although majority of episodes lasted a few minutes.  He denied associated symptoms of shortness of breath, chest discomfort, dizziness.      He underwent Nuclear stress test in 2014 due to complaints in palpitations which showed normal perfusion and EF 63%.       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    Suman presents to the office today for follow up. Accompanied by his wife. Reports he is doing well overall with no concerns. States he is doing well since increasing Toprol XL. Remains active, including moving the grass, working on rental property, and plaing [employer] ball.     Assessment and plan:   SVT   ECG today shows sinus rhythm    Has had SVT documented by Holter in the past.    Responded well to Toprol XL therapy.    Continue on Toprol XL for rate control      We discussed treatment options which include EP study and ablation versus continuation of medical therapy.  Risk-benefit alternative discussed.  Since he has remained asymptomatic and responded well to medical therapy recommend continuation of Toprol-XL.     We discussed signs and symptoms of SVT, ablation therapy including risk and benefit.  If he has breakthrough episodes and stops responding to medical therapy, EP study and ablation is recommended.    CAD   Calcium scroe 337 per CTA 11/2/23   Denies angina    Continue ASA, Toprol XL, and Lipitor    Follows with Dr. Rosio Rice  Encourage to use machine to prevent long term effects of untreated ARAMIS    Follow up in 1 year    Relevant available labs, and

## 2024-05-21 ENCOUNTER — OFFICE VISIT (OUTPATIENT)
Dept: CARDIOLOGY CLINIC | Age: 79
End: 2024-05-21
Payer: MEDICARE

## 2024-05-21 VITALS
DIASTOLIC BLOOD PRESSURE: 68 MMHG | SYSTOLIC BLOOD PRESSURE: 110 MMHG | BODY MASS INDEX: 28.71 KG/M2 | HEART RATE: 54 BPM | HEIGHT: 72 IN | OXYGEN SATURATION: 95 % | WEIGHT: 212 LBS

## 2024-05-21 VITALS
OXYGEN SATURATION: 95 % | HEIGHT: 72 IN | HEART RATE: 54 BPM | WEIGHT: 212 LBS | SYSTOLIC BLOOD PRESSURE: 110 MMHG | DIASTOLIC BLOOD PRESSURE: 68 MMHG | BODY MASS INDEX: 28.71 KG/M2

## 2024-05-21 DIAGNOSIS — E78.5 HYPERLIPIDEMIA, UNSPECIFIED HYPERLIPIDEMIA TYPE: ICD-10-CM

## 2024-05-21 DIAGNOSIS — R00.2 PALPITATIONS: ICD-10-CM

## 2024-05-21 DIAGNOSIS — I47.10 PSVT (PAROXYSMAL SUPRAVENTRICULAR TACHYCARDIA) (HCC): Primary | ICD-10-CM

## 2024-05-21 DIAGNOSIS — I25.10 CORONARY ARTERY DISEASE INVOLVING NATIVE CORONARY ARTERY OF NATIVE HEART WITHOUT ANGINA PECTORIS: Primary | ICD-10-CM

## 2024-05-21 DIAGNOSIS — G47.33 OSA (OBSTRUCTIVE SLEEP APNEA): ICD-10-CM

## 2024-05-21 DIAGNOSIS — I47.10 PSVT (PAROXYSMAL SUPRAVENTRICULAR TACHYCARDIA) (HCC): ICD-10-CM

## 2024-05-21 PROCEDURE — 93000 ELECTROCARDIOGRAM COMPLETE: CPT | Performed by: INTERNAL MEDICINE

## 2024-05-21 PROCEDURE — G8427 DOCREV CUR MEDS BY ELIG CLIN: HCPCS | Performed by: STUDENT IN AN ORGANIZED HEALTH CARE EDUCATION/TRAINING PROGRAM

## 2024-05-21 PROCEDURE — 1123F ACP DISCUSS/DSCN MKR DOCD: CPT | Performed by: STUDENT IN AN ORGANIZED HEALTH CARE EDUCATION/TRAINING PROGRAM

## 2024-05-21 PROCEDURE — G8419 CALC BMI OUT NRM PARAM NOF/U: HCPCS | Performed by: STUDENT IN AN ORGANIZED HEALTH CARE EDUCATION/TRAINING PROGRAM

## 2024-05-21 PROCEDURE — 1123F ACP DISCUSS/DSCN MKR DOCD: CPT | Performed by: INTERNAL MEDICINE

## 2024-05-21 PROCEDURE — 1036F TOBACCO NON-USER: CPT | Performed by: STUDENT IN AN ORGANIZED HEALTH CARE EDUCATION/TRAINING PROGRAM

## 2024-05-21 PROCEDURE — G8427 DOCREV CUR MEDS BY ELIG CLIN: HCPCS | Performed by: INTERNAL MEDICINE

## 2024-05-21 PROCEDURE — 1036F TOBACCO NON-USER: CPT | Performed by: INTERNAL MEDICINE

## 2024-05-21 PROCEDURE — G8419 CALC BMI OUT NRM PARAM NOF/U: HCPCS | Performed by: INTERNAL MEDICINE

## 2024-05-21 PROCEDURE — 99214 OFFICE O/P EST MOD 30 MIN: CPT | Performed by: INTERNAL MEDICINE

## 2024-05-21 PROCEDURE — 99214 OFFICE O/P EST MOD 30 MIN: CPT | Performed by: STUDENT IN AN ORGANIZED HEALTH CARE EDUCATION/TRAINING PROGRAM

## 2024-05-21 PROCEDURE — G2211 COMPLEX E/M VISIT ADD ON: HCPCS | Performed by: INTERNAL MEDICINE

## 2024-10-24 RX ORDER — ROSUVASTATIN CALCIUM 20 MG/1
20 TABLET, COATED ORAL DAILY
Qty: 90 TABLET | Refills: 3 | Status: SHIPPED | OUTPATIENT
Start: 2024-10-24

## 2024-10-24 NOTE — TELEPHONE ENCOUNTER
Received refill request for rosuvatatin 20MG TAB from     Last OV: 5/21/2024    Next OV: None, on RC list    Last Labs: 10/12/23 Lipid no recent hpatic    Last Filled:

## 2024-11-25 RX ORDER — ROSUVASTATIN CALCIUM 20 MG/1
20 TABLET, COATED ORAL DAILY
Qty: 90 TABLET | Refills: 3 | Status: SHIPPED | OUTPATIENT
Start: 2024-11-25

## 2024-11-25 NOTE — TELEPHONE ENCOUNTER
Received refill request for rosuvastatin from [facility] pharmacy.     Last OV: 5/21/24  RMM    Next OV: N/A    Last Labs: lipid 10/9/23    Last Filled: 10/24/24 [facility]

## 2025-01-21 ENCOUNTER — TELEPHONE (OUTPATIENT)
Dept: CARDIOLOGY CLINIC | Age: 80
End: 2025-01-21

## 2025-01-21 NOTE — TELEPHONE ENCOUNTER
Received fax for pre-operative cardiovascular evaluation form from [facility]. Attempted to call Maycol to find out how he was doing since last OV prior to sending form. No answer. LMOM with callback number.

## 2025-01-22 NOTE — TELEPHONE ENCOUNTER
Cardiac risk stratification letter written and signed by [facility]. Faxed to [facility] as requested.

## 2025-01-22 NOTE — TELEPHONE ENCOUNTER
Returned Maycol's call. He is doing well from a cardiac standpoint. Denies chest pain, SOB. Able to walk around house, stairs, go to the store without cardiac limitation. Major limiting factor is his hip and knees.

## 2025-02-24 ENCOUNTER — HOSPITAL ENCOUNTER (OUTPATIENT)
Dept: PHYSICAL THERAPY | Age: 80
Setting detail: THERAPIES SERIES
Discharge: HOME | End: 2025-02-24
Payer: MEDICARE

## 2025-02-24 DIAGNOSIS — M25.551 HIP PAIN, RIGHT: Primary | ICD-10-CM

## 2025-02-24 DIAGNOSIS — M25.651 DECREASED RANGE OF RIGHT HIP MOVEMENT: ICD-10-CM

## 2025-02-24 DIAGNOSIS — R29.898 DECREASED STRENGTH OF LOWER EXTREMITY: ICD-10-CM

## 2025-02-24 PROCEDURE — 97161 PT EVAL LOW COMPLEX 20 MIN: CPT

## 2025-02-24 PROCEDURE — 97140 MANUAL THERAPY 1/> REGIONS: CPT

## 2025-02-24 PROCEDURE — 97110 THERAPEUTIC EXERCISES: CPT

## 2025-02-24 NOTE — PLAN OF CARE
[facility] - Outpatient Rehabilitation and Therapy: [address] [place] [address] office: [phone] fax: [phone]     Physical Therapy Initial Evaluation Certification      Dear Silvino Del Angel,* ,    We had the pleasure of evaluating the following patient for physical therapy services at [facility] Outpatient Physical Therapy.  A summary of our findings can be found in the initial assessment below.  This includes our plan of care.  If you have any questions or concerns regarding these findings, please do not hesitate to contact me at the office phone number listed above.  Thank you for the referral.     Physician Signature:_______________________________Date:__________________  By signing above (or electronic signature), therapist’s plan is approved by physician       Physical Therapy: TREATMENT/PROGRESS NOTE   Patient: Suman Terry (79 y.o. male)   Examination Date: 2025   :  1945 MRN: [id number]   Visit #: 1   Insurance Allowable Auth Needed   BMN []Yes    []No    Insurance: Payor: MEDICARE / Plan: MEDICARE PART A AND B / Product Type: *No Product type* /   Insurance ID: [id number] - (Medicare)  Secondary Insurance (if applicable): [facility]   Treatment Diagnosis:     ICD-10-CM    1. Hip pain, right  M25.551       2. Decreased range of right hip movement  M25.651       3. Decreased strength of lower extremity  R29.898          Medical Diagnosis:  Unilateral primary osteoarthritis, right hip [M16.11]  Pain in right hip [M25.551]   Referring Physician: Silvino Del Angel*  PCP: Rolando Sibley MD     Plan of care signed (Y/N):     Date of Patient follow up with Physician:      Plan of Care Report: EVAL today  POC update due: (10 visits /OR AUTH LIMITS, whichever is less)  3/26/2025                                             Medical History:  Comorbidities:  Hypertension  Other Cardiovascular Conditions:    Relevant Medical History: Left

## 2025-02-27 ENCOUNTER — HOSPITAL ENCOUNTER (OUTPATIENT)
Dept: PHYSICAL THERAPY | Age: 80
Setting detail: THERAPIES SERIES
Discharge: HOME | End: 2025-02-27
Payer: MEDICARE

## 2025-02-27 PROCEDURE — 97110 THERAPEUTIC EXERCISES: CPT

## 2025-02-27 PROCEDURE — 97140 MANUAL THERAPY 1/> REGIONS: CPT

## 2025-02-27 PROCEDURE — 97530 THERAPEUTIC ACTIVITIES: CPT

## 2025-02-27 NOTE — FLOWSHEET NOTE
[facility] - Outpatient Rehabilitation and Therapy: [address] [place] [address] office: [phone] fax: [phone]     Physical Therapy Initial Evaluation Certification      Dear Silvino Del Angel,* ,    We had the pleasure of evaluating the following patient for physical therapy services at [facility] Outpatient Physical Therapy.  A summary of our findings can be found in the initial assessment below.  This includes our plan of care.  If you have any questions or concerns regarding these findings, please do not hesitate to contact me at the office phone number listed above.  Thank you for the referral.     Physician Signature:_______________________________Date:__________________  By signing above (or electronic signature), therapist’s plan is approved by physician       Physical Therapy: TREATMENT/PROGRESS NOTE   Patient: Suman Terry (79 y.o. male)   Examination Date: 2025   :  1945 MRN: [id number]   Visit #: 2   Insurance Allowable Auth Needed   BMN []Yes    []No    Insurance: Payor: MEDICARE / Plan: MEDICARE PART A AND B / Product Type: *No Product type* /   Insurance ID: [id number] - (Medicare)  Secondary Insurance (if applicable): [facility]   Treatment Diagnosis:         ICD-10-CM     1. Hip pain, right  M25.551         2. Decreased range of right hip movement  M25.651         3. Decreased strength of lower extremity  R29.898          Medical Diagnosis:  Unilateral primary osteoarthritis, right hip [M16.11]  Pain in right hip [M25.551]   Referring Physician: Silvino Del Angel*  PCP: Rolando Sibley MD     Plan of care signed (Y/N):     Date of Patient follow up with Physician:      Plan of Care Report: EVAL today  POC update due: (10 visits /OR AUTH LIMITS, whichever is less)  3/26/2025                                             Medical History:  Comorbidities:  Hypertension  Other Cardiovascular Conditions:    Relevant Medical

## 2025-03-03 ENCOUNTER — HOSPITAL ENCOUNTER (OUTPATIENT)
Dept: PHYSICAL THERAPY | Age: 80
Setting detail: THERAPIES SERIES
Discharge: HOME | End: 2025-03-03
Payer: MEDICARE

## 2025-03-03 PROCEDURE — 97530 THERAPEUTIC ACTIVITIES: CPT

## 2025-03-03 PROCEDURE — 97140 MANUAL THERAPY 1/> REGIONS: CPT

## 2025-03-03 PROCEDURE — 97110 THERAPEUTIC EXERCISES: CPT

## 2025-03-03 NOTE — FLOWSHEET NOTE
Patient is not progressing as expected and requires additional follow up with physician  [] Other:     TREATMENT PLAN     Frequency/Duration: 2-3x/week for 8-10 weeks for the following treatment interventions:    Interventions:  Therapeutic Exercise (44908) including: strength training, ROM, and functional mobility  Therapeutic Activities (28735) including: functional mobility training and education.  Neuromuscular Re-education (96965) activation and proprioception, including postural re-education.    Gait Training (74810) for normalization of ambulation patterns and AD training.   Manual Therapy (35998) as indicated to include: Passive Range of Motion, Gr I-IV mobilizations, Soft Tissue Mobilization, Dry Needling/IASTM, Manual Lymph Drainage, and Trigger Point Release  Patient education on joint protection, postural re-education, activity modification, and progression of HEP    Plan: POC initiated as per evaluation    Electronically Signed by Abdulaziz Gandhi PT  Date: 03/03/2025     Note: Portions of this note have been templated and/or copied from initial evaluation, reassessments and prior notes for documentation efficiency.    Note: If patient does not return for scheduled/recommended follow up visits, this note will serve as a discharge from care along with the most recent update on progress.

## 2025-03-06 ENCOUNTER — HOSPITAL ENCOUNTER (OUTPATIENT)
Dept: PHYSICAL THERAPY | Age: 80
Setting detail: THERAPIES SERIES
Discharge: HOME | End: 2025-03-06
Payer: MEDICARE

## 2025-03-06 PROCEDURE — 97140 MANUAL THERAPY 1/> REGIONS: CPT

## 2025-03-06 PROCEDURE — 97110 THERAPEUTIC EXERCISES: CPT

## 2025-03-06 PROCEDURE — 97530 THERAPEUTIC ACTIVITIES: CPT

## 2025-03-06 NOTE — FLOWSHEET NOTE
require adjustment due to lack of progress  [] Patient is not progressing as expected and requires additional follow up with physician  [] Other:     TREATMENT PLAN     Frequency/Duration: 2-3x/week for 8-10 weeks for the following treatment interventions:    Interventions:  Therapeutic Exercise (14480) including: strength training, ROM, and functional mobility  Therapeutic Activities (22336) including: functional mobility training and education.  Neuromuscular Re-education (32659) activation and proprioception, including postural re-education.    Gait Training (18682) for normalization of ambulation patterns and AD training.   Manual Therapy (45501) as indicated to include: Passive Range of Motion, Gr I-IV mobilizations, Soft Tissue Mobilization, Dry Needling/IASTM, Manual Lymph Drainage, and Trigger Point Release  Patient education on joint protection, postural re-education, activity modification, and progression of HEP    Plan:  Progress resistance    Electronically Signed by Abdulaziz Gandhi PT  Date: 03/06/2025     Note: Portions of this note have been templated and/or copied from initial evaluation, reassessments and prior notes for documentation efficiency.    Note: If patient does not return for scheduled/recommended follow up visits, this note will serve as a discharge from care along with the most recent update on progress.

## 2025-03-10 ENCOUNTER — HOSPITAL ENCOUNTER (OUTPATIENT)
Dept: PHYSICAL THERAPY | Age: 80
Setting detail: THERAPIES SERIES
Discharge: HOME | End: 2025-03-10
Payer: MEDICARE

## 2025-03-10 PROCEDURE — 97110 THERAPEUTIC EXERCISES: CPT

## 2025-03-10 PROCEDURE — 97530 THERAPEUTIC ACTIVITIES: CPT

## 2025-03-10 NOTE — FLOWSHEET NOTE
Touch[employer] access code: Access Code: [email or web address]      ASSESSMENT   Assessment:   Suman Terry is a 79 y.o. male presenting today to Outpatient PT with signs and symptoms consistent with s/p Right CATRINA. Progressing well in home health and transition to outpatient PT. Added closed chain interventions this date including short range sit to stand and low level step ups with good tolerance and strength. Note that he has knee pain still and will need to work both hip and knee to reduce pain. .    Pt. presents with the functional impairments and activity limitations listed below and would benefit from Outpatient PT to address the below impairments as well as improve pain, and restore function.     Functional Impairments:   Noted lumbar/proximal hip/LE joint hypomobility  Decreased LE functional ROM  Decreased core/proximal hip strength and neuromuscular control  Decreased LE functional strength   Reduced balance/proprioceptive control  Decreased LE endurance    Functional Activity Limitations (from functional questionnaire and intake):  Reduced ability to tolerate prolonged functional positions  Reduced ability to perform lifting, reaching, carrying tasks  Reduced ability to squat  Reduced ability to ambulate prolonged functional periods/distances/surfaces  Reduced ability to ascend/descend stairs  reduced ability to kneel  difficulty with self care    Participation Restrictions:   Reduced participation in self care  Reduced participation in home management   Reduced participation in social activities  Reduced participation in sports/recreation    Classification :   Signs/symptoms consistent with post-surgical status including decreased ROM, strength and function.       Barriers to/and or personal factors that will affect rehab potential:   Age  Co-morbidities  past PT/medical experience    Physical Therapy Evaluation Complexity Justification  [x] A history of present problem and no personal factors and/or co-morbidities  no chills/no decreased eating/drinking/no dizziness/no fever/no nausea/no pain/no tingling/no vomiting/no weakness

## 2025-03-12 ENCOUNTER — HOSPITAL ENCOUNTER (OUTPATIENT)
Dept: PHYSICAL THERAPY | Age: 80
Setting detail: THERAPIES SERIES
Discharge: HOME | End: 2025-03-12
Payer: MEDICARE

## 2025-03-12 PROCEDURE — 97530 THERAPEUTIC ACTIVITIES: CPT

## 2025-03-12 PROCEDURE — 97110 THERAPEUTIC EXERCISES: CPT

## 2025-03-12 NOTE — FLOWSHEET NOTE
training and education.  Neuromuscular Re-education (11389) activation and proprioception, including postural re-education.    Gait Training (70952) for normalization of ambulation patterns and AD training.   Manual Therapy (53853) as indicated to include: Passive Range of Motion, Gr I-IV mobilizations, Soft Tissue Mobilization, Dry Needling/IASTM, Manual Lymph Drainage, and Trigger Point Release  Patient education on joint protection, postural re-education, activity modification, and progression of HEP    Plan:  Progress resistance, pickleball dual tasking.     Electronically Signed by Abdulaziz Gandhi PT  Date: 03/12/2025     Note: Portions of this note have been templated and/or copied from initial evaluation, reassessments and prior notes for documentation efficiency.    Note: If patient does not return for scheduled/recommended follow up visits, this note will serve as a discharge from care along with the most recent update on progress.

## 2025-03-13 ENCOUNTER — APPOINTMENT (OUTPATIENT)
Dept: PHYSICAL THERAPY | Age: 80
End: 2025-03-13
Payer: MEDICARE

## 2025-03-17 ENCOUNTER — HOSPITAL ENCOUNTER (OUTPATIENT)
Dept: PHYSICAL THERAPY | Age: 80
Setting detail: THERAPIES SERIES
Discharge: HOME | End: 2025-03-17
Payer: MEDICARE

## 2025-03-17 PROCEDURE — 97110 THERAPEUTIC EXERCISES: CPT

## 2025-03-17 PROCEDURE — 97530 THERAPEUTIC ACTIVITIES: CPT

## 2025-03-17 NOTE — FLOWSHEET NOTE
experience    Physical Therapy Evaluation Complexity Justification  [x] A history of present problem and no personal factors and/or co-morbidities that impact the plan of care  [x] A total of 1-2 elements  found upon examination of body systems using standardized tests and measures addressing any of the following: body structures, functions (impairments), activity limitations, and/or participation restrictions  [x] A clinical presentation with stable and/or uncomplicated characteristics   [x] Clinical decision making of LOW (20713 - Typically 20 minutes face-to-face) complexity using standardized patient assessment instrument and/or measurable assessment of functional outcome.    Today's Assessment:  Maycol presented after the weekend - was sore but able to complete yard work well. He states that he feels stronger every week but understands that he needs several more weeks of therapy especially to return to pickle ball. Brought pickle ball paddle this date and implemented light volley with side stepping to implement dual tasking. Cont to work onto lateral motions as tolerated. Will cont to benefit from skilled Progressions in strength to return to PLOF.     Medical Necessity Documentation:  I certify that this patient meets the below criteria necessary for medical necessity for care and/or justification of therapy services:  The patient has functional impairments and/or activity limitations and would benefit from continued outpatient therapy services to address the deficits outlined in the patients goals    Return to Play: NA    Prognosis for POC: [x] Good [] Fair  [] Poor    Patient requires continued skilled intervention: [x] Yes  [] No      CHARGE CAPTURE     PT CHARGE GRID   CPT Code (TIMED) minutes # CPT Code (UNTIMED) #     Therex (58711)  22 2  EVAL:LOW (33260 - Typically 20 minutes face-to-face)     Neuromusc. Re-ed (80685)    Re-Eval (68706)     Manual (64733) 5   Estim Unattended (84123)     Ther. Act (65484)

## 2025-03-20 ENCOUNTER — HOSPITAL ENCOUNTER (OUTPATIENT)
Dept: PHYSICAL THERAPY | Age: 80
Setting detail: THERAPIES SERIES
Discharge: HOME | End: 2025-03-20
Payer: MEDICARE

## 2025-03-20 PROCEDURE — 97110 THERAPEUTIC EXERCISES: CPT

## 2025-03-20 PROCEDURE — 97140 MANUAL THERAPY 1/> REGIONS: CPT

## 2025-03-20 PROCEDURE — 97530 THERAPEUTIC ACTIVITIES: CPT

## 2025-03-20 NOTE — FLOWSHEET NOTE
[facility] - Outpatient Rehabilitation and Therapy: [address] [place] [address] office: [phone] fax: [phone]      Physical Therapy: TREATMENT/PROGRESS NOTE   Patient: Suman Terry (79 y.o. male)   Examination Date: 2025   :  1945 MRN: [id number]   Visit #: 8   Insurance Allowable Auth Needed   BMN []Yes    []No    Insurance: Payor: MEDICARE / Plan: MEDICARE PART A AND B / Product Type: *No Product type* /   Insurance ID: [id number] - (Medicare)  Secondary Insurance (if applicable): [facility]   Treatment Diagnosis:         ICD-10-CM     1. Hip pain, right  M25.551         2. Decreased range of right hip movement  M25.651         3. Decreased strength of lower extremity  R29.898          Medical Diagnosis:  Unilateral primary osteoarthritis, right hip [M16.11]  Pain in right hip [M25.551]   Referring Physician: Silvino Del Angel*  PCP: Rolando Sibley MD     Plan of care signed (Y/N):     Date of Patient follow up with Physician:      Plan of Care Report: NO  POC update due: (10 visits /OR AUTH LIMITS, whichever is less)  3/26/2025                                             Medical History:  Comorbidities:  Hypertension  Other Cardiovascular Conditions:    Relevant Medical History: Left CATRINA                                         Precautions/ Contra-indications:           Latex allergy:  NO  Pacemaker:    NO  Contraindications for Manipulation: None  Date of Surgery: 2025  Other:    Red Flags:  None    Suicide Screening:   The patient did not verbalize a primary behavioral concern, suicidal ideation, suicidal intent, or demonstrate suicidal behaviors.    Preferred Language for Healthcare:   [x] English       [] other:    SUBJECTIVE EXAMINATION     Patient stated complaint: Maycol states that he is doing well overall. He reports that he was able to do more in the yard yesterday and noticed that he was feeling very good with it.        Test used

## 2025-03-24 ENCOUNTER — APPOINTMENT (OUTPATIENT)
Dept: PHYSICAL THERAPY | Age: 80
End: 2025-03-24
Payer: MEDICARE

## 2025-03-25 ENCOUNTER — HOSPITAL ENCOUNTER (OUTPATIENT)
Dept: PHYSICAL THERAPY | Age: 80
Setting detail: THERAPIES SERIES
Discharge: HOME | End: 2025-03-25
Payer: MEDICARE

## 2025-03-25 PROCEDURE — 97530 THERAPEUTIC ACTIVITIES: CPT

## 2025-03-25 PROCEDURE — 97110 THERAPEUTIC EXERCISES: CPT

## 2025-03-25 NOTE — FLOWSHEET NOTE
[facility] - Outpatient Rehabilitation and Therapy: [address] [place] [address] office: [phone] fax: [phone]      Physical Therapy: TREATMENT/PROGRESS NOTE   Patient: Suman Terry (79 y.o. male)   Examination Date: 2025   :  1945 MRN: [id number]   Visit #: 9   Insurance Allowable Auth Needed   BMN []Yes    []No    Insurance: Payor: MEDICARE / Plan: MEDICARE PART A AND B / Product Type: *No Product type* /   Insurance ID: [id number] - (Medicare)  Secondary Insurance (if applicable): [facility]   Treatment Diagnosis:         ICD-10-CM     1. Hip pain, right  M25.551         2. Decreased range of right hip movement  M25.651         3. Decreased strength of lower extremity  R29.898          Medical Diagnosis:  Unilateral primary osteoarthritis, right hip [M16.11]  Pain in right hip [M25.551]   Referring Physician: Silvino Del Angel*  PCP: Rolando Sibley MD     Plan of care signed (Y/N):     Date of Patient follow up with Physician:      Plan of Care Report: NO  POC update due: (10 visits /OR AUTH LIMITS, whichever is less)  3/26/2025                                             Medical History:  Comorbidities:  Hypertension  Other Cardiovascular Conditions:    Relevant Medical History: Left CATRINA                                         Precautions/ Contra-indications:           Latex allergy:  NO  Pacemaker:    NO  Contraindications for Manipulation: None  Date of Surgery: 2025  Other:    Red Flags:  None    Suicide Screening:   The patient did not verbalize a primary behavioral concern, suicidal ideation, suicidal intent, or demonstrate suicidal behaviors.    Preferred Language for Healthcare:   [x] English       [] other:    SUBJECTIVE EXAMINATION     Patient stated complaint: Maycol states that he has been sore. 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        Test used Initial score  2025   Pain

## 2025-03-27 ENCOUNTER — HOSPITAL ENCOUNTER (OUTPATIENT)
Dept: PHYSICAL THERAPY | Age: 80
Setting detail: THERAPIES SERIES
Discharge: HOME | End: 2025-03-27
Payer: MEDICARE

## 2025-03-27 PROCEDURE — 97110 THERAPEUTIC EXERCISES: CPT

## 2025-03-27 PROCEDURE — 97530 THERAPEUTIC ACTIVITIES: CPT

## 2025-03-27 NOTE — FLOWSHEET NOTE
[facility] - Outpatient Rehabilitation and Therapy: [address] [place] [address] office: [phone] fax: [phone]      Physical Therapy: TREATMENT/PROGRESS NOTE   Patient: Suman Terry (79 y.o. male)   Examination Date: 2025   :  1945 MRN: [id number]   Visit #: 10   Insurance Allowable Auth Needed   BMN []Yes    []No    Insurance: Payor: MEDICARE / Plan: MEDICARE PART A AND B / Product Type: *No Product type* /   Insurance ID: [id number] - (Medicare)  Secondary Insurance (if applicable): [facility]   Treatment Diagnosis:         ICD-10-CM     1. Hip pain, right  M25.551         2. Decreased range of right hip movement  M25.651         3. Decreased strength of lower extremity  R29.898          Medical Diagnosis:  Unilateral primary osteoarthritis, right hip [M16.11]  Pain in right hip [M25.551]   Referring Physician: Silvino Del Angel*  PCP: Rolando Sibley MD     Plan of care signed (Y/N):     Date of Patient follow up with Physician:      Plan of Care Report: NO  POC update due: (10 visits /OR AUTH LIMITS, whichever is less)  3/26/2025                                             Medical History:  Comorbidities:  Hypertension  Other Cardiovascular Conditions:    Relevant Medical History: Left CATRINA                                         Precautions/ Contra-indications:           Latex allergy:  NO  Pacemaker:    NO  Contraindications for Manipulation: None  Date of Surgery: 2025  Other:    Red Flags:  None    Suicide Screening:   The patient did not verbalize a primary behavioral concern, suicidal ideation, suicidal intent, or demonstrate suicidal behaviors.    Preferred Language for Healthcare:   [x] English       [] other:    SUBJECTIVE EXAMINATION     Patient stated complaint: Maycol states that he is generally very sore today and has had to take Advil to reduce pain.        Test used Initial score  2025   Pain Summary VAS 0 0

## 2025-03-31 ENCOUNTER — HOSPITAL ENCOUNTER (OUTPATIENT)
Dept: PHYSICAL THERAPY | Age: 80
Setting detail: THERAPIES SERIES
Discharge: HOME | End: 2025-03-31
Payer: MEDICARE

## 2025-03-31 PROCEDURE — 97140 MANUAL THERAPY 1/> REGIONS: CPT

## 2025-03-31 PROCEDURE — 97110 THERAPEUTIC EXERCISES: CPT

## 2025-03-31 PROCEDURE — 97530 THERAPEUTIC ACTIVITIES: CPT

## 2025-04-01 NOTE — PATIENT INSTRUCTIONS
Left Heart Cath Patient Pre-procedure Instructions   (Also called coronary angiogram)    Do NOT eat or drink anything after midnight morning of procedure    MEDICATIONS:  Your medications have been reviewed. Please follow medication instructions below  It is okay to drink a sip of water with meds morning of procedure  It is okay to take ALL medications morning of procedure    Transportation: Bring someone to drive you home.     Scheduling: April NOEL is our full time procedure . She will call you to schedule your procedure.    Allergies: Do you have an allergy to dye or contrast? No.    Labs: We need to check blood work within 30 days of your procedure (CBC & BMP). Please go to any [facility] lab to get your labs drawn prior to your procedure.

## 2025-04-03 ENCOUNTER — HOSPITAL ENCOUNTER (OUTPATIENT)
Dept: PHYSICAL THERAPY | Age: 80
Setting detail: THERAPIES SERIES
Discharge: HOME | End: 2025-04-03
Payer: MEDICARE

## 2025-04-03 PROCEDURE — 97140 MANUAL THERAPY 1/> REGIONS: CPT

## 2025-04-03 PROCEDURE — 97530 THERAPEUTIC ACTIVITIES: CPT

## 2025-04-03 PROCEDURE — 97110 THERAPEUTIC EXERCISES: CPT

## 2025-04-03 NOTE — FLOWSHEET NOTE
[facility] - Outpatient Rehabilitation and Therapy: [address] [place] [address] office: [phone] fax: [phone]      Physical Therapy: TREATMENT/PROGRESS NOTE   Patient: Suman Terry (79 y.o. male)   Examination Date: 2025   :  1945 MRN: [id number]   Visit #: 12   Insurance Allowable Auth Needed   BMN []Yes    []No    Insurance: Payor: MEDICARE / Plan: MEDICARE PART A AND B / Product Type: *No Product type* /   Insurance ID: [id number] - (Medicare)  Secondary Insurance (if applicable): [facility]   Treatment Diagnosis:         ICD-10-CM     1. Hip pain, right  M25.551         2. Decreased range of right hip movement  M25.651         3. Decreased strength of lower extremity  R29.898          Medical Diagnosis:  Unilateral primary osteoarthritis, right hip [M16.11]  Pain in right hip [M25.551]   Referring Physician: Silvino Del Angel*  PCP: Rolando Sibley MD     Plan of care signed (Y/N):     Date of Patient follow up with Physician:      Plan of Care Report: NO  POC update due: (10 visits /OR AUTH LIMITS, whichever is less)  3/26/2025                                             Medical History:  Comorbidities:  Hypertension  Other Cardiovascular Conditions:    Relevant Medical History: Left CATRINA                                         Precautions/ Contra-indications:           Latex allergy:  NO  Pacemaker:    NO  Contraindications for Manipulation: None  Date of Surgery: 2025  Other:    Red Flags:  None    Suicide Screening:   The patient did not verbalize a primary behavioral concern, suicidal ideation, suicidal intent, or demonstrate suicidal behaviors.    Preferred Language for Healthcare:   [x] English       [] other:    SUBJECTIVE EXAMINATION     Patient stated complaint: Maycol states that he is doing well. He reports that he saw the Surgeon who states that he is doing really well but he should hold off on high level pickleball for now.

## 2025-04-07 ENCOUNTER — HOSPITAL ENCOUNTER (OUTPATIENT)
Dept: PHYSICAL THERAPY | Age: 80
Setting detail: THERAPIES SERIES
Discharge: HOME | End: 2025-04-07
Payer: MEDICARE

## 2025-04-07 PROCEDURE — 97110 THERAPEUTIC EXERCISES: CPT

## 2025-04-07 PROCEDURE — 97140 MANUAL THERAPY 1/> REGIONS: CPT

## 2025-04-07 PROCEDURE — 97530 THERAPEUTIC ACTIVITIES: CPT

## 2025-04-07 NOTE — FLOWSHEET NOTE
rounded shoulders, increased thoracic kyphosis, and decreased WB on R    Bandages/Dressings/Incisions:  Patients wound/incision appears to be healing as expected    Dermatomes: Abnormal findings listed below  All WNL    Myotomes: Abnormal findings listed below  All WNL    Reflexes: Abnormal findings listed below  All reflexes WNL (2+)    Specific Joint Mobility Testing/Accessory Motions:      Thoracic: hypomobility of T3 T4 T5 T6 T7  Lumbar: hypomobility of  Hip:  hypomobile on R guarding   Knee: hypomobile on R     Special Tests:  N/A    Gait:    Pattern: antalgic pattern, decreased pawel, decreased step length, decreased trunk rotation, forward flexed posture, decreased step length on left, and decreased stance time on right  Assistive Device Used: Single point cane presented with SPC on right, instructed on appropriate usage on left.     Balance:  [x] WNL      [] NT       [] Dysfunction noted  Comment:     Falls Risk Assessment (30 days):   Falls Risk assessed and no intervention required.  NA  Time Up and Go (TUG):   Not Assessed        Exercises/Interventions     Therapeutic Ex (39099)  resistance Sets/time Reps Notes/Cues/Progressions   Bike  5'  C subjective   Bridge c extensions 24# Neutral     SB hip flexion KTC       Low trunk rotation    Bilateral    Shoulder extension in lunge blue    Mobility Spacer Stomp       Hip Three way       FR flexion       BOSU Stomp shoulder extension BOSU  Blue 10\" 10    SB flexion Green 1 20    Hip fallout Double   Bilateral   Side stepping blue SC 1 long   Bilateral   Lunge hor abd blue    Leg press eccentric 2-1  130# 3 12    Side stepping red HC 3 20'    Side stepping with pickleball volley     TrA Heel taps CL Leg down Up short lever, out heel slide long lever   Knee Extension 30#    Manual Intervention (35967)  TIME     PROM  5  Flexion/ ER/IR in flexion not max.    Add knee gapping  5     Quad stretch  1.5 minutes                   NMR re-education (35390) resistance

## 2025-04-07 NOTE — PROGRESS NOTES
valvular stenosis or regurgitation.   - Prominent mid ascending aorta (3.9 cm).     Cardiac CTA 11/2/2023  Scattered calcified and noncalcified plaque is seen throughout the coronary  arteries.  Scattered areas of mild luminal narrowing are suspected in the  right coronary artery, and circumflex.  There is a focal area of calcified  plaque seen proximally in the left anterior descending coronary artery, with  estimated narrowing approaching, but likely less than 70%     6 mm noncalcified pulmonary nodule in the right     Calcium score 337.  This is in the 30th percentile for patient age        Stress Test 10/17/23   **Myocardial perfusion is abnormal. Fixed defect. Low-moderate risk study.**       Monitor: 4/4/2017- 5/3/2017   SR with PVCs, all of which were automatic triggers. He did not have his concerning sx.      Monitor: 7/31/2017- 8/2/2017   PSVT (? AVNRT ?), NSVT            I have addressed the patient's cardiac risk factors and adjusted pharmacologic treatment as needed. In addition, I have reinforced the need for patient directed risk factor modification.    I independently reviewed the ECG    All questions and concerns were addressed with the patient. Alternatives to treatment were discussed.     Thank you for allowing to us to participate in the care of Suman Landry PA-C  [facility]   Office: [phone]

## 2025-04-08 ENCOUNTER — OFFICE VISIT (OUTPATIENT)
Dept: CARDIOLOGY CLINIC | Age: 80
End: 2025-04-08
Payer: MEDICARE

## 2025-04-08 ENCOUNTER — TELEPHONE (OUTPATIENT)
Dept: CARDIOLOGY CLINIC | Age: 80
End: 2025-04-08

## 2025-04-08 VITALS
WEIGHT: 207 LBS | OXYGEN SATURATION: 95 % | SYSTOLIC BLOOD PRESSURE: 114 MMHG | DIASTOLIC BLOOD PRESSURE: 66 MMHG | HEIGHT: 72 IN | HEART RATE: 65 BPM | BODY MASS INDEX: 28.04 KG/M2

## 2025-04-08 DIAGNOSIS — R93.1 ABNORMAL COMPUTED TOMOGRAPHY ANGIOGRAPHY OF HEART: ICD-10-CM

## 2025-04-08 DIAGNOSIS — E78.5 HYPERLIPIDEMIA, UNSPECIFIED HYPERLIPIDEMIA TYPE: Primary | ICD-10-CM

## 2025-04-08 DIAGNOSIS — R94.39 ABNORMAL STRESS TEST: ICD-10-CM

## 2025-04-08 DIAGNOSIS — I47.10 SVT (SUPRAVENTRICULAR TACHYCARDIA): ICD-10-CM

## 2025-04-08 DIAGNOSIS — E78.5 HYPERLIPIDEMIA, UNSPECIFIED HYPERLIPIDEMIA TYPE: ICD-10-CM

## 2025-04-08 DIAGNOSIS — I25.10 CORONARY ARTERY DISEASE DUE TO LIPID RICH PLAQUE: ICD-10-CM

## 2025-04-08 DIAGNOSIS — R00.2 PALPITATIONS: ICD-10-CM

## 2025-04-08 DIAGNOSIS — I25.83 CORONARY ARTERY DISEASE DUE TO LIPID RICH PLAQUE: Primary | ICD-10-CM

## 2025-04-08 DIAGNOSIS — I25.83 CORONARY ARTERY DISEASE DUE TO LIPID RICH PLAQUE: ICD-10-CM

## 2025-04-08 DIAGNOSIS — R53.83 OTHER FATIGUE: ICD-10-CM

## 2025-04-08 DIAGNOSIS — G47.33 OSA (OBSTRUCTIVE SLEEP APNEA): ICD-10-CM

## 2025-04-08 DIAGNOSIS — I25.10 CORONARY ARTERY DISEASE DUE TO LIPID RICH PLAQUE: Primary | ICD-10-CM

## 2025-04-08 PROCEDURE — G8427 DOCREV CUR MEDS BY ELIG CLIN: HCPCS

## 2025-04-08 PROCEDURE — 1160F RVW MEDS BY RX/DR IN RCRD: CPT

## 2025-04-08 PROCEDURE — G8419 CALC BMI OUT NRM PARAM NOF/U: HCPCS | Performed by: STUDENT IN AN ORGANIZED HEALTH CARE EDUCATION/TRAINING PROGRAM

## 2025-04-08 PROCEDURE — 1036F TOBACCO NON-USER: CPT

## 2025-04-08 PROCEDURE — G8427 DOCREV CUR MEDS BY ELIG CLIN: HCPCS | Performed by: STUDENT IN AN ORGANIZED HEALTH CARE EDUCATION/TRAINING PROGRAM

## 2025-04-08 PROCEDURE — G2211 COMPLEX E/M VISIT ADD ON: HCPCS | Performed by: STUDENT IN AN ORGANIZED HEALTH CARE EDUCATION/TRAINING PROGRAM

## 2025-04-08 PROCEDURE — 1159F MED LIST DOCD IN RCRD: CPT | Performed by: STUDENT IN AN ORGANIZED HEALTH CARE EDUCATION/TRAINING PROGRAM

## 2025-04-08 PROCEDURE — 1036F TOBACCO NON-USER: CPT | Performed by: STUDENT IN AN ORGANIZED HEALTH CARE EDUCATION/TRAINING PROGRAM

## 2025-04-08 PROCEDURE — G2211 COMPLEX E/M VISIT ADD ON: HCPCS

## 2025-04-08 PROCEDURE — G8419 CALC BMI OUT NRM PARAM NOF/U: HCPCS

## 2025-04-08 PROCEDURE — 99214 OFFICE O/P EST MOD 30 MIN: CPT

## 2025-04-08 PROCEDURE — 99214 OFFICE O/P EST MOD 30 MIN: CPT | Performed by: STUDENT IN AN ORGANIZED HEALTH CARE EDUCATION/TRAINING PROGRAM

## 2025-04-08 PROCEDURE — 1159F MED LIST DOCD IN RCRD: CPT

## 2025-04-08 PROCEDURE — 1123F ACP DISCUSS/DSCN MKR DOCD: CPT | Performed by: STUDENT IN AN ORGANIZED HEALTH CARE EDUCATION/TRAINING PROGRAM

## 2025-04-08 PROCEDURE — 1123F ACP DISCUSS/DSCN MKR DOCD: CPT

## 2025-04-08 PROCEDURE — 93000 ELECTROCARDIOGRAM COMPLETE: CPT

## 2025-04-08 NOTE — TELEPHONE ENCOUNTER
I spoke to Maycol and offered him Friday 4/11/25, and he stated the rest of this week won't work, Dr. Avelar is out next week and Maycol stated the week of 4/28 would be great.     Date of Procedure: Monday 4/28/25 @ [facility] with Dr. Avelar     Time of arrival: 6:45 am     Procedure time: 8:00 am     Maycol is agreeable to date and time. Reviewed instructions and he verbalized understanding. Encouraged to call with any questions or concerns.     Published on [employer], scheduled on [employer] and e-mailed to cath lab.

## 2025-04-08 NOTE — TELEPHONE ENCOUNTER
I called and spoke to Maycol and he was driving at the time and stated he will give me a call back when he gets home and can look at his schedule.

## 2025-04-08 NOTE — TELEPHONE ENCOUNTER
Please schedule LHC with [facility] at [facility] due to abnormal stress test, CTA and fatigue    Left Heart Cath Patient Pre-procedure Instructions    Do NOT eat or drink anything after midnight morning of procedure    MEDICATIONS:  Your medications have been reviewed. Please follow medication instructions below  It is okay to drink a sip of water with meds morning of procedure  It is okay to take ALL medications morning of procedure    Transportation: Bring someone to drive you home.     Scheduling: April NOEL is our full time procedure . She will call you to schedule your procedure.    Allergies: Do you have an allergy to dye or contrast? No.    Labs: We need to check blood work within 30 days of your procedure (CBC & BMP). Please go to any [facility] lab to get your labs drawn prior to your procedure.

## 2025-04-08 NOTE — PATIENT INSTRUCTIONS
Take an extra half dose of metoprolol if you have palpitations that do not resolve with vagal maneuvers

## 2025-04-08 NOTE — PROGRESS NOTES
[facility] HEART [place]    4/8/2025    Referring Provider: Rolando Sibley MD    HISTORY:  Suman Terry is a 79 y.o. male presenting for his annual follow up. He will also be seeing my partner, MATTEHW Landry with EP this morning. He had an abnormal stress test 10/2023 and a subsequent CCTA 11/2023. It showed areas of mild luminal narrowing in the RCA and LCx. There was a focal area of calcified plaque seen proximally in the LAD, estimated at less than 70%. He denied any cardiac symptoms at that time. He was able to play pickleball for several hours at a time multiple days per week. Further ischemic evaluation was deferred due to lack of symptoms. Since his las OV, he had his hip replaced and tolerated well.     Today he ***    REVIEW OF SYSTEMS:  A complete review of systems was reviewed and is negative except as noted in the history of present illness.    Prior to Visit Medications    Medication Sig Taking? Authorizing Provider   rosuvastatin (CRESTOR) 20 MG tablet Take 1 tablet by mouth daily  Ricardo Avelar MD   metoprolol succinate (TOPROL XL) 50 MG extended release tablet Take 0.5 tablets by mouth in the morning and at bedtime  Ricardo Avelar MD   aspirin 81 MG tablet Take 1 tablet by mouth daily  Provider, MD Chary      No Known Allergies  Past Medical History:   Diagnosis Date    Hyperlipidemia     Hypertension 2013    ARAMIS (obstructive sleep apnea)      Past Surgical History:   Procedure Laterality Date    HERNIA REPAIR      KNEE ARTHROSCOPY Left     OTHER SURGICAL HISTORY Left 03/22/2018    LEFT KNEE ARTHROSCOPE,PLICA EXCISION;SYNOVECTOMY PARTIAL    SHOULDER SURGERY Right     NERVE RELEASE - 2001    TOTAL HIP ARTHROPLASTY Left 02/23/2023     Social History     Tobacco Use    Smoking status: Former     Current packs/day: 0.00     Average packs/day: 1 pack/day for 19.3 years (19.3 ttl pk-yrs)     Types: Cigarettes, Pipe     Start date: 9/1/1966     Quit date: 1/1/1986     Years since 
intact    IMAGING:    Coronary CTA 11/2/2023  Scattered calcified and noncalcified plaque is seen throughout the coronary arteries.  Scattered areas of mild luminal narrowing are suspected in the right coronary artery, and circumflex.  There is a focal area of calcified plaque seen proximally in the left anterior descending coronary artery, with estimated narrowing approaching, but likely less than 70%     6 mm noncalcified pulmonary nodule in the right     Calcium score 337.  This is in the 30th percentile for patient age     Nuc MPI 10/17/2023  The overall quality of the study is good. There is subdiaphragmatic attenuation.  Left ventricular cavity size is dilated. Right ventricle is normal in size.  Spect imaging demonstrates a small area of mildly decreased perfusion in the apex and apical inferior segment. The defect is present at rest and stress with corresponding wall motion abnormality, consistent with prior infarct.  No reversible ischemia.     Sum stress score of 4. No visual TID. Calculated TID of 0.89  Left ventricular ejection fraction of 61%.  Sensitivity for detection of ischemia is reduced on anti-anginals.      **Myocardial perfusion is abnormal. Fixed defect. Low-moderate risk study.**     CT calcium score 08/10/2023  1.  The patient's total calcium score is 595.     2.  This calcium score indicates that there is at least extensive plaque burden.   3.  The implied cardiovascular risk is high.   4. Mild dilatation aortic root 4.0 cm     Echo 7/9/2018 ([facility])  - Exam indication: Palpitations   - The left ventricle is normal in size. There is mild septal left ventricular   hypertrophy. Left ventricular systolic function is normal. EF = 64 ± 5% (2D biplane) Beat to beat variability due to frequent ectopy throughout exam.   - The right ventricle is normal in size. Right ventricular systolic function is normal.   - There is no significant valvular stenosis or regurgitation.   - Prominent mid

## 2025-04-10 ENCOUNTER — HOSPITAL ENCOUNTER (OUTPATIENT)
Dept: PHYSICAL THERAPY | Age: 80
Setting detail: THERAPIES SERIES
Discharge: HOME | End: 2025-04-10
Payer: MEDICARE

## 2025-04-10 PROCEDURE — 97140 MANUAL THERAPY 1/> REGIONS: CPT

## 2025-04-10 PROCEDURE — 97110 THERAPEUTIC EXERCISES: CPT

## 2025-04-10 PROCEDURE — 97530 THERAPEUTIC ACTIVITIES: CPT

## 2025-04-10 NOTE — PLAN OF CARE
progression of HEP    Plan:  Progress resistance, pickleball dual tasking.     Electronically Signed by Abdulaziz Gandhi, LINDA  Date: 04/10/2025     Note: Portions of this note have been templated and/or copied from initial evaluation, reassessments and prior notes for documentation efficiency.    Note: If patient does not return for scheduled/recommended follow up visits, this note will serve as a discharge from care along with the most recent update on progress.

## 2025-04-15 ENCOUNTER — HOSPITAL ENCOUNTER (OUTPATIENT)
Dept: PHYSICAL THERAPY | Age: 80
Setting detail: THERAPIES SERIES
Discharge: HOME | End: 2025-04-15
Payer: MEDICARE

## 2025-04-15 PROCEDURE — 97110 THERAPEUTIC EXERCISES: CPT

## 2025-04-15 PROCEDURE — 97140 MANUAL THERAPY 1/> REGIONS: CPT

## 2025-04-15 NOTE — PRE SEDATION
Brief Pre-Op Note/Sedation Assessment      Suman Terry  1945  [id number]  7:14 AM    Planned Procedure: Cardiac Catheterization Procedure  Post Procedure Plan: Return to same level of care  Consent: I have discussed with the patient and/or the patient representative the indication, alternatives, and the possible risks and/or complications of the planned procedure and the anesthesia methods. The patient and/or patient representative appear to understand and agree to proceed.        Chief Complaint:   Fatigue      Indications for Cath Procedure:  Presentation:  Suspected CAD  2.  Anginal Classification within 2 weeks:  CCS II - Slight limitation, with angina only during vigorous physical activity  3.  Angina Symptoms Assessment:  Typical Chest Pain  4.  Heart Failure Class within last 2 weeks:  No symptoms  5.  Cardiovascular Instability:  No    Prior Ischemic Workup/Eval:  Pre-Procedural Medications: Yes: Aspirin, Beta Blockers, and STATIN  2.   Stress Test Completed?  Yes:  Stress or Imaging Studies Performed (within ANY time period):   Type:  Cardiac CTA  Results:  Positive:  Abnormal CTA/MRI Extent of Ischemia:  Low Risk (<1% annual death or MI)    Does Patient need surgery?  Cath Valve Surgery:  No    Pre-Procedure Medical History:  Vital Signs:  BP (!) 142/82   Pulse 56   Temp 98.1 °F (36.7 °C) (Infrared)   Resp 14   Ht 1.829 m (6')   Wt 93.9 kg (207 lb)   SpO2 98%   BMI 28.07 kg/m²     Allergies:  No Known Allergies  Medications:    Current Facility-Administered Medications   Medication Dose Route Frequency Provider Last Rate Last Admin    sodium chloride flush 0.9 % injection 5-40 mL  5-40 mL IntraVENous 2 times per day Ricardo Avelar MD        sodium chloride flush 0.9 % injection 5-40 mL  5-40 mL IntraVENous PRN Ricardo Avelar MD        0.9 % sodium chloride infusion   IntraVENous PRN Ricardo Avelar MD           Past Medical History:    Past Medical History:   Diagnosis Date

## 2025-04-15 NOTE — FLOWSHEET NOTE
neuromuscular control  Decreased LE functional strength   Reduced balance/proprioceptive control  Decreased LE endurance    Functional Activity Limitations (from functional questionnaire and intake):  Reduced ability to tolerate prolonged functional positions  Reduced ability to perform lifting, reaching, carrying tasks  Reduced ability to squat  Reduced ability to ambulate prolonged functional periods/distances/surfaces  Reduced ability to ascend/descend stairs  reduced ability to kneel  difficulty with self care    Participation Restrictions:   Reduced participation in self care  Reduced participation in home management   Reduced participation in social activities  Reduced participation in sports/recreation    Classification :   Signs/symptoms consistent with post-surgical status including decreased ROM, strength and function.       Barriers to/and or personal factors that will affect rehab potential:   Age  Co-morbidities  past PT/medical experience    Physical Therapy Evaluation Complexity Justification  [x] A history of present problem and no personal factors and/or co-morbidities that impact the plan of care  [x] A total of 1-2 elements  found upon examination of body systems using standardized tests and measures addressing any of the following: body structures, functions (impairments), activity limitations, and/or participation restrictions  [x] A clinical presentation with stable and/or uncomplicated characteristics   [x] Clinical decision making of LOW (11716 - Typically 20 minutes face-to-face) complexity using standardized patient assessment instrument and/or measurable assessment of functional outcome.    Today's Assessment:  Maycol presented stating that his knee is becoming his largest concern aside from his cardiac test. This session worked on knee with improvements in pain free mobility as well as isolating control to quad. Did note that lateral sliders and lateral step downs did increase the knee pain

## 2025-04-15 NOTE — H&P
Update History & Physical    The patient's History and Physical of April 8, 2025 was reviewed with the patient and I examined the patient. There was no change.     Plan: The risks, benefits, expected outcome, and alternative to the recommended procedure have been discussed with the patient. Patient understands and wants to proceed with the procedure.     Electronically signed by Ricardo Avelar MD on 4/28/2025 at 7:12 AM       [facility]    4/8/2025    Referring Provider: Rolando Sibley MD    HISTORY:  Suman Terry is a 79 y.o. male presenting for his annual follow up. He will also be seeing my partner, MATTHEW Landry with EP this morning. He had an abnormal stress test 10/2023 and a subsequent CCTA 11/2023. It showed areas of mild luminal narrowing in the RCA and LCx. There was a focal area of calcified plaque seen proximally in the LAD, estimated at less than 70%. He denied any cardiac symptoms at that time. He was able to play pickleball for several hours at a time multiple days per week. Further ischemic evaluation was deferred due to lack of symptoms. Since his las OV, he had his hip replaced and tolerated well.     Today he is doing okay overall. He had a hip replacement about 10 weeks ago. He has really been slowing down compared to six months ago. He occasionally feels episodes of tachycardia that is relieved with vagal nerve stimulation, but seems to be more symptomatic than he was previously. He was previously playing pickle ball and getting around easily, but now feels more fatigued with normal day to day activities. Does not have CP, but does have limiting fatigue, objectively worse than when stress testing completed with no symptoms at that time.     We discussed LHC. He is agreeable.     He is seeing NATALIE Nation today as well.       REVIEW OF SYSTEMS:  A complete review of systems was reviewed and is negative except as noted in the history of present illness.    Prior to Visit

## 2025-04-17 ENCOUNTER — HOSPITAL ENCOUNTER (OUTPATIENT)
Dept: PHYSICAL THERAPY | Age: 80
Setting detail: THERAPIES SERIES
Discharge: HOME | End: 2025-04-17
Payer: MEDICARE

## 2025-04-17 PROCEDURE — 97140 MANUAL THERAPY 1/> REGIONS: CPT

## 2025-04-17 PROCEDURE — 97110 THERAPEUTIC EXERCISES: CPT

## 2025-04-17 NOTE — FLOWSHEET NOTE
[facility] - Outpatient Rehabilitation and Therapy: [address] [place] [id number] office: [phone] fax: [phone]      Physical Therapy: TREATMENT/PROGRESS NOTE   Patient: Suman Terry (79 y.o. male)   Examination Date: 2025   :  1945 MRN: [id number]   Visit #: 16   Insurance Allowable Auth Needed   BMN []Yes    []No    Insurance: Payor: MEDICARE / Plan: MEDICARE PART A AND B / Product Type: *No Product type* /   Insurance ID: [id number] - (Medicare)  Secondary Insurance (if applicable): [facility]   Treatment Diagnosis:         ICD-10-CM     1. Hip pain, right  M25.551         2. Decreased range of right hip movement  M25.651         3. Decreased strength of lower extremity  R29.898          Medical Diagnosis:  Unilateral primary osteoarthritis, right hip [M16.11]  Pain in right hip [M25.551]   Referring Physician: Silvino Del Angel*  PCP: Rolando Sibley MD     Plan of care signed (Y/N):     Date of Patient follow up with Physician:      Plan of Care Report: YES, Date Range for this report: 2025 to 4/10/2025  POC update due: (10 visits /OR AUTH LIMITS, whichever is less)  5/10/2025                                             Medical History:  Comorbidities:  Hypertension  Other Cardiovascular Conditions:    Relevant Medical History: Left CATRINA                                         Precautions/ Contra-indications:           Latex allergy:  NO  Pacemaker:    NO  Contraindications for Manipulation: None  Date of Surgery: 2025  Other:    Red Flags:  None    Suicide Screening:   The patient did not verbalize a primary behavioral concern, suicidal ideation, suicidal intent, or demonstrate suicidal behaviors.    Preferred Language for Healthcare:   [x] English       [] other:    SUBJECTIVE EXAMINATION     Patient stated complaint: Maycol states that his hip is feeling really good. His knee is not feeling great. Still sore.        Test used

## 2025-04-21 ENCOUNTER — APPOINTMENT (OUTPATIENT)
Dept: PHYSICAL THERAPY | Age: 80
End: 2025-04-21
Payer: MEDICARE

## 2025-04-22 ENCOUNTER — HOSPITAL ENCOUNTER (OUTPATIENT)
Dept: PHYSICAL THERAPY | Age: 80
Setting detail: THERAPIES SERIES
Discharge: HOME | End: 2025-04-22
Payer: MEDICARE

## 2025-04-22 PROCEDURE — 97140 MANUAL THERAPY 1/> REGIONS: CPT

## 2025-04-22 PROCEDURE — 97110 THERAPEUTIC EXERCISES: CPT

## 2025-04-22 NOTE — FLOWSHEET NOTE
PF        Inversion        Eversion        Repeated Movements: [] Normal  [] Abnormal [] N/A    Palpation:   Patient reported tenderness with palpation  Location:Proximal hip at incision.     Posture:   forward head, forward trunk, rounded shoulders, increased thoracic kyphosis, and decreased WB on R    Bandages/Dressings/Incisions:  Patients wound/incision appears to be healing as expected    Dermatomes: Abnormal findings listed below  All WNL    Myotomes: Abnormal findings listed below  All WNL    Reflexes: Abnormal findings listed below  All reflexes WNL (2+)    Specific Joint Mobility Testing/Accessory Motions:      Thoracic: hypomobility of T3 T4 T5 T6 T7  Lumbar: hypomobility of  Hip:  hypomobile on R guarding   Knee: hypomobile on R     Special Tests:  N/A    Gait:    Pattern: antalgic pattern, decreased pawel, decreased step length, decreased trunk rotation, forward flexed posture, decreased step length on left, and decreased stance time on right  Assistive Device Used: Single point cane presented with SPC on right, instructed on appropriate usage on left.     Balance:  [x] WNL      [] NT       [] Dysfunction noted  Comment:     Falls Risk Assessment (30 days):   Falls Risk assessed and no intervention required.  NA  Time Up and Go (TUG):   Not Assessed        Exercises/Interventions     Therapeutic Ex (38822)  resistance Sets/time Reps Notes/Cues/Progressions   Bike  5'  C subjective   Bridge c extensions 24# 3 12 Neutral     SB hip flexion KTC       Low trunk rotation    Bilateral    Shoulder extension in lunge blue    Mobility Spacer Stomp       Hip Three way       FR flexion       BOSU Stomp shoulder extension BOSU  Blue 10\" 10    SB flexion Green 1 20    Hip fallout Double   Bilateral   Side stepping blue SC 1 long   Bilateral   Lunge hor abd blue    Rebounder  MS [place] 2 20ea Forward and lateral   SLR c quad set 2# 3 10    Mobility spacer stomp [place] 10\" 10    TKE 5.5 plates 10\"

## 2025-04-24 ENCOUNTER — HOSPITAL ENCOUNTER (OUTPATIENT)
Dept: PHYSICAL THERAPY | Age: 80
Setting detail: THERAPIES SERIES
Discharge: HOME | End: 2025-04-24
Payer: MEDICARE

## 2025-04-24 PROCEDURE — 97140 MANUAL THERAPY 1/> REGIONS: CPT

## 2025-04-24 PROCEDURE — 97110 THERAPEUTIC EXERCISES: CPT

## 2025-04-24 NOTE — FLOWSHEET NOTE
reducing/eliminating soft tissue swelling/inflammation/restriction, improving soft tissue extensibility and allowing for proper ROM for normal function with self care, mobility, lifting and ambulation    GOALS     Patient stated goal: Return to full activities.   [x] Progressing: [] Met: [] Not Met: [] Adjusted    Therapist goals for Patient:   Short Term Goals: To be achieved in: 2 weeks  1Independent in HEP and progression per patient tolerance, in order to prevent re-injury.   [] Progressing: [x] Met: [] Not Met: [] Adjusted  Patient will have a decrease in pain to <1/10 to facilitate improvement in movement, function, and ADLs as indicated by Functional Deficits.  [] Progressing: [] Met: [] Not Met: [] Adjusted    Long Term Goals: To be achieved in: 10 weeks  Disability index score of 15% or less for the LEFS to assist with reaching prior level of function with activities such as navigating up and down stairs, low level plyometrics to return to pickle ball. .  [x] Progressing: [] Met: [] Not Met: [] Adjusted  Patient will demonstrate increased AROM of Hip to 120/10/45/25 for flexion/extension/ER/IR without pain to allow for proper joint functioning to enable patient to return to ADLs, home management, work activities and sport activities including pickle bal..   [x] Progressing: [] Met: [] Not Met: [] Adjusted  Patient will demonstrate increased Strength of global hip to at least 4+/5 throughout without pain to allow for proper functional mobility to enable patient to return to ADLs, home management, workplace activities, as pickle ball. .   [x] Progressing: [] Met: [] Not Met: [] Adjusted  Patient will return to completing the TUG <12s in order to demonstrate that he is no longer a fall risk without increased symptoms or restriction.   [] Progressing: [x] Met: [] Not Met: [] Adjusted  Patient will return to completing 12+ reps in 30s STS in order to show adequate muscular endurance to return to pickleball

## 2025-04-28 ENCOUNTER — APPOINTMENT (OUTPATIENT)
Dept: PHYSICAL THERAPY | Age: 80
End: 2025-04-28
Payer: MEDICARE

## 2025-04-28 ENCOUNTER — HOSPITAL ENCOUNTER (OUTPATIENT)
Age: 80
Setting detail: OUTPATIENT SURGERY
Discharge: HOME | End: 2025-04-28
Attending: STUDENT IN AN ORGANIZED HEALTH CARE EDUCATION/TRAINING PROGRAM | Admitting: STUDENT IN AN ORGANIZED HEALTH CARE EDUCATION/TRAINING PROGRAM
Payer: MEDICARE

## 2025-04-28 VITALS
OXYGEN SATURATION: 99 % | WEIGHT: 207 LBS | DIASTOLIC BLOOD PRESSURE: 62 MMHG | BODY MASS INDEX: 28.04 KG/M2 | TEMPERATURE: 98.1 F | RESPIRATION RATE: 16 BRPM | HEART RATE: 50 BPM | SYSTOLIC BLOOD PRESSURE: 112 MMHG | HEIGHT: 72 IN

## 2025-04-28 DIAGNOSIS — R53.83 OTHER FATIGUE: Primary | ICD-10-CM

## 2025-04-28 DIAGNOSIS — R93.1 ABNORMAL COMPUTED TOMOGRAPHY ANGIOGRAPHY OF HEART: ICD-10-CM

## 2025-04-28 DIAGNOSIS — R53.83 FATIGUE: ICD-10-CM

## 2025-04-28 DIAGNOSIS — R94.39 ABNORMAL STRESS TEST: ICD-10-CM

## 2025-04-28 LAB
ANION GAP SERPL CALCULATED.3IONS-SCNC: 10 MMOL/L (ref 3–16)
BUN SERPL-MCNC: 16 MG/DL (ref 7–20)
CALCIUM SERPL-MCNC: 9 MG/DL (ref 8.3–10.6)
CHLORIDE SERPL-SCNC: 107 MMOL/L (ref 99–110)
CO2 SERPL-SCNC: 25 MMOL/L (ref 21–32)
CREAT SERPL-MCNC: 0.8 MG/DL (ref 0.8–1.3)
DEPRECATED RDW RBC AUTO: 17.2 % (ref 12.4–15.4)
ECHO BSA: 2.18 M2
GFR SERPLBLD CREATININE-BSD FMLA CKD-EPI: >90 ML/MIN/{1.73_M2}
GLUCOSE SERPL-MCNC: 94 MG/DL (ref 70–99)
HCT VFR BLD AUTO: 42.9 % (ref 40.5–52.5)
HGB BLD-MCNC: 14.1 G/DL (ref 13.5–17.5)
MCH RBC QN AUTO: 28.3 PG (ref 26–34)
MCHC RBC AUTO-ENTMCNC: 32.8 G/DL (ref 31–36)
MCV RBC AUTO: 86.2 FL (ref 80–100)
PLATELET # BLD AUTO: 211 K/UL (ref 135–450)
PMV BLD AUTO: 7.7 FL (ref 5–10.5)
POTASSIUM SERPL-SCNC: 4.6 MMOL/L (ref 3.5–5.1)
RBC # BLD AUTO: 4.98 M/UL (ref 4.2–5.9)
SODIUM SERPL-SCNC: 142 MMOL/L (ref 136–145)
WBC # BLD AUTO: 5.9 K/UL (ref 4–11)

## 2025-04-28 PROCEDURE — 93458 L HRT ARTERY/VENTRICLE ANGIO: CPT | Performed by: STUDENT IN AN ORGANIZED HEALTH CARE EDUCATION/TRAINING PROGRAM

## 2025-04-28 PROCEDURE — 2709999900 HC NON-CHARGEABLE SUPPLY: Performed by: STUDENT IN AN ORGANIZED HEALTH CARE EDUCATION/TRAINING PROGRAM

## 2025-04-28 PROCEDURE — 99152 MOD SED SAME PHYS/QHP 5/>YRS: CPT | Performed by: STUDENT IN AN ORGANIZED HEALTH CARE EDUCATION/TRAINING PROGRAM

## 2025-04-28 PROCEDURE — C1769 GUIDE WIRE: HCPCS | Performed by: STUDENT IN AN ORGANIZED HEALTH CARE EDUCATION/TRAINING PROGRAM

## 2025-04-28 PROCEDURE — 7100000011 HC PHASE II RECOVERY - ADDTL 15 MIN: Performed by: STUDENT IN AN ORGANIZED HEALTH CARE EDUCATION/TRAINING PROGRAM

## 2025-04-28 PROCEDURE — 85027 COMPLETE CBC AUTOMATED: CPT

## 2025-04-28 PROCEDURE — 6360000002 HC RX W HCPCS: Performed by: STUDENT IN AN ORGANIZED HEALTH CARE EDUCATION/TRAINING PROGRAM

## 2025-04-28 PROCEDURE — 80048 BASIC METABOLIC PNL TOTAL CA: CPT

## 2025-04-28 PROCEDURE — 6360000004 HC RX CONTRAST MEDICATION: Performed by: STUDENT IN AN ORGANIZED HEALTH CARE EDUCATION/TRAINING PROGRAM

## 2025-04-28 PROCEDURE — 36415 COLL VENOUS BLD VENIPUNCTURE: CPT

## 2025-04-28 PROCEDURE — C1894 INTRO/SHEATH, NON-LASER: HCPCS | Performed by: STUDENT IN AN ORGANIZED HEALTH CARE EDUCATION/TRAINING PROGRAM

## 2025-04-28 PROCEDURE — 93005 ELECTROCARDIOGRAM TRACING: CPT

## 2025-04-28 PROCEDURE — 7100000010 HC PHASE II RECOVERY - FIRST 15 MIN: Performed by: STUDENT IN AN ORGANIZED HEALTH CARE EDUCATION/TRAINING PROGRAM

## 2025-04-28 PROCEDURE — 2500000003 HC RX 250 WO HCPCS: Performed by: STUDENT IN AN ORGANIZED HEALTH CARE EDUCATION/TRAINING PROGRAM

## 2025-04-28 RX ORDER — HEPARIN SODIUM 1000 [USP'U]/ML
INJECTION, SOLUTION INTRAVENOUS; SUBCUTANEOUS PRN
Status: DISCONTINUED | OUTPATIENT
Start: 2025-04-28 | End: 2025-04-28 | Stop reason: HOSPADM

## 2025-04-28 RX ORDER — IOPAMIDOL 755 MG/ML
INJECTION, SOLUTION INTRAVASCULAR PRN
Status: DISCONTINUED | OUTPATIENT
Start: 2025-04-28 | End: 2025-04-28 | Stop reason: HOSPADM

## 2025-04-28 RX ORDER — SODIUM CHLORIDE 0.9 % (FLUSH) 0.9 %
5-40 SYRINGE (ML) INJECTION EVERY 12 HOURS SCHEDULED
Status: CANCELLED | OUTPATIENT
Start: 2025-04-28

## 2025-04-28 RX ORDER — SODIUM CHLORIDE 0.9 % (FLUSH) 0.9 %
5-40 SYRINGE (ML) INJECTION EVERY 12 HOURS SCHEDULED
Status: DISCONTINUED | OUTPATIENT
Start: 2025-04-28 | End: 2025-04-28 | Stop reason: HOSPADM

## 2025-04-28 RX ORDER — SODIUM CHLORIDE 0.9 % (FLUSH) 0.9 %
5-40 SYRINGE (ML) INJECTION PRN
Status: CANCELLED | OUTPATIENT
Start: 2025-04-28

## 2025-04-28 RX ORDER — SODIUM CHLORIDE 9 MG/ML
INJECTION, SOLUTION INTRAVENOUS PRN
Status: DISCONTINUED | OUTPATIENT
Start: 2025-04-28 | End: 2025-04-28 | Stop reason: HOSPADM

## 2025-04-28 RX ORDER — SODIUM CHLORIDE 0.9 % (FLUSH) 0.9 %
5-40 SYRINGE (ML) INJECTION PRN
Status: DISCONTINUED | OUTPATIENT
Start: 2025-04-28 | End: 2025-04-28 | Stop reason: HOSPADM

## 2025-04-28 RX ORDER — ACETAMINOPHEN 325 MG/1
650 TABLET ORAL EVERY 4 HOURS PRN
Status: CANCELLED | OUTPATIENT
Start: 2025-04-28

## 2025-04-28 RX ORDER — MIDAZOLAM HYDROCHLORIDE 1 MG/ML
INJECTION, SOLUTION INTRAMUSCULAR; INTRAVENOUS PRN
Status: DISCONTINUED | OUTPATIENT
Start: 2025-04-28 | End: 2025-04-28 | Stop reason: HOSPADM

## 2025-04-28 RX ORDER — SODIUM CHLORIDE 9 MG/ML
INJECTION, SOLUTION INTRAVENOUS PRN
Status: CANCELLED | OUTPATIENT
Start: 2025-04-28

## 2025-04-28 RX ORDER — SODIUM CHLORIDE 9 MG/ML
INJECTION, SOLUTION INTRAVENOUS CONTINUOUS
Status: CANCELLED | OUTPATIENT
Start: 2025-04-28 | End: 2025-04-28

## 2025-04-28 RX ORDER — DIPHENHYDRAMINE HYDROCHLORIDE 50 MG/ML
INJECTION, SOLUTION INTRAMUSCULAR; INTRAVENOUS PRN
Status: DISCONTINUED | OUTPATIENT
Start: 2025-04-28 | End: 2025-04-28 | Stop reason: HOSPADM

## 2025-04-28 RX ORDER — LIDOCAINE HYDROCHLORIDE 10 MG/ML
INJECTION, SOLUTION INFILTRATION; PERINEURAL PRN
Status: DISCONTINUED | OUTPATIENT
Start: 2025-04-28 | End: 2025-04-28 | Stop reason: HOSPADM

## 2025-04-28 RX ORDER — ONDANSETRON 2 MG/ML
INJECTION INTRAMUSCULAR; INTRAVENOUS PRN
Status: DISCONTINUED | OUTPATIENT
Start: 2025-04-28 | End: 2025-04-28 | Stop reason: HOSPADM

## 2025-04-29 LAB
EKG ATRIAL RATE: 55 BPM
EKG DIAGNOSIS: NORMAL
EKG P AXIS: 45 DEGREES
EKG P-R INTERVAL: 182 MS
EKG Q-T INTERVAL: 454 MS
EKG QRS DURATION: 100 MS
EKG QTC CALCULATION (BAZETT): 434 MS
EKG R AXIS: -18 DEGREES
EKG T AXIS: 32 DEGREES
EKG VENTRICULAR RATE: 55 BPM

## 2025-04-30 ENCOUNTER — APPOINTMENT (OUTPATIENT)
Dept: PHYSICAL THERAPY | Age: 80
End: 2025-04-30
Payer: MEDICARE

## 2025-07-24 NOTE — PROGRESS NOTES
[facility]   Electrophysiology  Scott Landry PA-C  Attending EP: Mehzad    Date: 7/25/2025  I had the privilege of visiting Suman Terry in the office.     Chief Complaint:   Chief Complaint   Patient presents with    Follow-up     No cardiac symptoms at this time.     History of Present Illness: History obtained from patient and medical record.    Suman Terry is 79 y.o. 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 Normal stress louie int 2014. MCOT in 2017 showed possible AVNRT and short runs of NSVT.    Last office visit 4/2025 pt was having episodes of palpitations once or twice per week. Had [facility] showing no need for intervention 4/28    Interval history: Today, Suman Terry is being seen for follow up on SVT/NSVT    States things have been going good. Has been active, has been playing pickleball and yard work.     Has not had any palpitations for last month. Previously was having about once a week.    Denies having chest pain, palpitations, shortness of breath, orthopnea/PND, cough, or dizziness at the time of this visit.    With regard to medication therapy the patient has been compliant with prescribed regimen. They have tolerated therapy to date.       Assessment:    SVT  - Noted on Holter monitor  - Not having many palpitations for past month  - Continue Toprol 50 mg daily, take extra half dose if he has palpitations that do not resolve with vagal maneuvers  - Continue Toprol 50 mg daily  - Can consider repeat cardiac monitor if he starts having more symptoms  - If he does start having more symptoms then ablation is a consideration, can do monitor first to evaluate his rhythm. Pt prefers to adjust meds first if he start having more episodes.    NSVT  - With nonobstructive CAD  - Continue Toprol    CAD  - No complaints of angina at this time  - [facility]

## 2025-07-25 ENCOUNTER — OFFICE VISIT (OUTPATIENT)
Dept: CARDIOLOGY CLINIC | Age: 80
End: 2025-07-25
Payer: MEDICARE

## 2025-07-25 VITALS
BODY MASS INDEX: 28.99 KG/M2 | DIASTOLIC BLOOD PRESSURE: 68 MMHG | SYSTOLIC BLOOD PRESSURE: 134 MMHG | HEIGHT: 72 IN | OXYGEN SATURATION: 98 % | WEIGHT: 214 LBS | HEART RATE: 68 BPM

## 2025-07-25 DIAGNOSIS — I47.10 SVT (SUPRAVENTRICULAR TACHYCARDIA): ICD-10-CM

## 2025-07-25 DIAGNOSIS — R00.2 PALPITATIONS: ICD-10-CM

## 2025-07-25 DIAGNOSIS — R94.39 ABNORMAL STRESS TEST: Primary | ICD-10-CM

## 2025-07-25 DIAGNOSIS — R53.83 OTHER FATIGUE: ICD-10-CM

## 2025-07-25 PROCEDURE — 1160F RVW MEDS BY RX/DR IN RCRD: CPT

## 2025-07-25 PROCEDURE — G8419 CALC BMI OUT NRM PARAM NOF/U: HCPCS

## 2025-07-25 PROCEDURE — 1159F MED LIST DOCD IN RCRD: CPT

## 2025-07-25 PROCEDURE — 99214 OFFICE O/P EST MOD 30 MIN: CPT

## 2025-07-25 PROCEDURE — G8427 DOCREV CUR MEDS BY ELIG CLIN: HCPCS

## 2025-07-25 PROCEDURE — 1036F TOBACCO NON-USER: CPT

## 2025-07-25 PROCEDURE — 1123F ACP DISCUSS/DSCN MKR DOCD: CPT

## 2025-07-25 PROCEDURE — G2211 COMPLEX E/M VISIT ADD ON: HCPCS

## 2025-07-25 PROCEDURE — 93000 ELECTROCARDIOGRAM COMPLETE: CPT

## (undated) DEVICE — TR BAND RADIAL ARTERY COMPRESSION DEVICE: Brand: TR BAND

## (undated) DEVICE — CATHETER DIAG L100CM DIA5.2FR 0.038IN POLYUR COR JR4 STD

## (undated) DEVICE — PAD, DEFIB, ADULT, RADIOTRANS, PHYSIO: Brand: MEDLINE

## (undated) DEVICE — CATH LAB PACK: Brand: MEDLINE INDUSTRIES, INC.

## (undated) DEVICE — GUIDEWIRE VASC L260CM DIA0.035IN RAD 3MM J TIP L7CM PTFE

## (undated) DEVICE — Device: Brand: NOMOLINE™ LH ADULT NASAL CO2 CANNULA WITH O2 4M

## (undated) DEVICE — GLIDESHEATH SLENDER ACCESS KIT: Brand: GLIDESHEATH SLENDER

## (undated) DEVICE — CATHETER DIAG AD 5FR L110CM 145DEG COR GRY HYDRPHLC NYL

## (undated) DEVICE — KIT AT-X65 ANGIOTOUCH HAND CONTROLLER

## (undated) DEVICE — CATHETER ANGIO 5FR L100CM GRY S STL NYL JL3.5 3 SEG BRAID L

## (undated) DEVICE — DRAPE,ANGIO,BRACH,STERILE,38X44: Brand: MEDLINE